# Patient Record
Sex: FEMALE | Race: WHITE | NOT HISPANIC OR LATINO | Employment: FULL TIME | URBAN - METROPOLITAN AREA
[De-identification: names, ages, dates, MRNs, and addresses within clinical notes are randomized per-mention and may not be internally consistent; named-entity substitution may affect disease eponyms.]

---

## 2017-01-03 ENCOUNTER — GENERIC CONVERSION - ENCOUNTER (OUTPATIENT)
Dept: OTHER | Facility: OTHER | Age: 32
End: 2017-01-03

## 2018-01-04 ENCOUNTER — APPOINTMENT (OUTPATIENT)
Dept: URGENT CARE | Facility: CLINIC | Age: 33
End: 2018-01-04
Payer: OTHER MISCELLANEOUS

## 2018-01-04 PROCEDURE — 99283 EMERGENCY DEPT VISIT LOW MDM: CPT

## 2018-01-04 PROCEDURE — G0382 LEV 3 HOSP TYPE B ED VISIT: HCPCS

## 2018-01-11 ENCOUNTER — TRANSCRIBE ORDERS (OUTPATIENT)
Dept: URGENT CARE | Facility: CLINIC | Age: 33
End: 2018-01-11

## 2018-01-11 ENCOUNTER — APPOINTMENT (OUTPATIENT)
Dept: URGENT CARE | Facility: CLINIC | Age: 33
End: 2018-01-11
Payer: OTHER MISCELLANEOUS

## 2018-01-11 ENCOUNTER — APPOINTMENT (OUTPATIENT)
Dept: RADIOLOGY | Facility: CLINIC | Age: 33
End: 2018-01-11
Payer: OTHER MISCELLANEOUS

## 2018-01-11 ENCOUNTER — GENERIC CONVERSION - ENCOUNTER (OUTPATIENT)
Dept: OTHER | Facility: OTHER | Age: 33
End: 2018-01-11

## 2018-01-11 DIAGNOSIS — T14.90XA INJURY: Primary | ICD-10-CM

## 2018-01-11 PROCEDURE — 99213 OFFICE O/P EST LOW 20 MIN: CPT

## 2018-01-11 PROCEDURE — 72050 X-RAY EXAM NECK SPINE 4/5VWS: CPT

## 2018-01-15 NOTE — RESULT NOTES
Message   Normal thyroid testing       Verified Results  (LC) Thyroxine (T4) Free, Direct, S [id number] 09:40AM Janiya Hendrickson, [place]     Test Name Result Flag Reference   T4,Free(Direct) 1 09 ng/dL  0 82-1 77     (1) TSH [id number] 09:40AM Jacinda, [place]     Test Name Result Flag Reference   TSH 3 620 uIU/mL  0 450-4 500

## 2018-01-17 ENCOUNTER — TRANSCRIBE ORDERS (OUTPATIENT)
Dept: URGENT CARE | Facility: CLINIC | Age: 33
End: 2018-01-17

## 2018-01-17 ENCOUNTER — APPOINTMENT (OUTPATIENT)
Dept: URGENT CARE | Facility: CLINIC | Age: 33
End: 2018-01-17
Payer: OTHER MISCELLANEOUS

## 2018-01-17 ENCOUNTER — GENERIC CONVERSION - ENCOUNTER (OUTPATIENT)
Dept: OTHER | Facility: OTHER | Age: 33
End: 2018-01-17

## 2018-01-17 ENCOUNTER — APPOINTMENT (OUTPATIENT)
Dept: RADIOLOGY | Facility: CLINIC | Age: 33
End: 2018-01-17
Payer: OTHER MISCELLANEOUS

## 2018-01-17 DIAGNOSIS — T14.90XA INJURY: Primary | ICD-10-CM

## 2018-01-17 PROCEDURE — 99213 OFFICE O/P EST LOW 20 MIN: CPT

## 2018-01-17 PROCEDURE — 73030 X-RAY EXAM OF SHOULDER: CPT

## 2018-01-23 ENCOUNTER — APPOINTMENT (OUTPATIENT)
Dept: URGENT CARE | Facility: CLINIC | Age: 33
End: 2018-01-23
Payer: OTHER MISCELLANEOUS

## 2018-01-23 PROCEDURE — 99213 OFFICE O/P EST LOW 20 MIN: CPT

## 2018-01-24 ENCOUNTER — EVALUATION (OUTPATIENT)
Dept: PHYSICAL THERAPY | Facility: CLINIC | Age: 33
End: 2018-01-24
Payer: OTHER MISCELLANEOUS

## 2018-01-24 DIAGNOSIS — M25.552 PAIN OF LEFT HIP JOINT: ICD-10-CM

## 2018-01-24 DIAGNOSIS — M54.2 CERVICALGIA: ICD-10-CM

## 2018-01-24 DIAGNOSIS — M25.511 ACUTE PAIN OF BOTH SHOULDERS: ICD-10-CM

## 2018-01-24 DIAGNOSIS — M54.50 ACUTE BILATERAL LOW BACK PAIN WITHOUT SCIATICA: Primary | ICD-10-CM

## 2018-01-24 DIAGNOSIS — M25.512 ACUTE PAIN OF BOTH SHOULDERS: ICD-10-CM

## 2018-01-24 PROCEDURE — G8990 OTHER PT/OT CURRENT STATUS: HCPCS | Performed by: PHYSICAL THERAPIST

## 2018-01-24 PROCEDURE — 97162 PT EVAL MOD COMPLEX 30 MIN: CPT | Performed by: PHYSICAL THERAPIST

## 2018-01-24 PROCEDURE — G8991 OTHER PT/OT GOAL STATUS: HCPCS | Performed by: PHYSICAL THERAPIST
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
## 2018-01-26 ENCOUNTER — OFFICE VISIT (OUTPATIENT)
Dept: PHYSICAL THERAPY | Facility: CLINIC | Age: 33
End: 2018-01-26
Payer: OTHER MISCELLANEOUS

## 2018-01-26 DIAGNOSIS — M54.2 CERVICALGIA: ICD-10-CM

## 2018-01-26 DIAGNOSIS — M54.50 ACUTE BILATERAL LOW BACK PAIN WITHOUT SCIATICA: Primary | ICD-10-CM

## 2018-01-26 DIAGNOSIS — M25.511 ACUTE PAIN OF BOTH SHOULDERS: ICD-10-CM

## 2018-01-26 DIAGNOSIS — M25.512 ACUTE PAIN OF BOTH SHOULDERS: ICD-10-CM

## 2018-01-26 DIAGNOSIS — M25.552 PAIN OF LEFT HIP JOINT: ICD-10-CM

## 2018-01-26 PROCEDURE — 97140 MANUAL THERAPY 1/> REGIONS: CPT | Performed by: PHYSICAL THERAPIST

## 2018-01-26 PROCEDURE — 97010 HOT OR COLD PACKS THERAPY: CPT | Performed by: PHYSICAL THERAPIST

## 2018-01-26 PROCEDURE — 97112 NEUROMUSCULAR REEDUCATION: CPT | Performed by: PHYSICAL THERAPIST

## 2018-01-26 PROCEDURE — 97110 THERAPEUTIC EXERCISES: CPT | Performed by: PHYSICAL THERAPIST

## 2018-01-26 NOTE — PROGRESS NOTES
Daily Note     Today's date: 2018  Patient name: Carisa Harrell  : 1985  MRN: [id number]  Referring provider: Rosalie Kumari DO  Dx:   Encounter Diagnoses   Name Primary?  Acute bilateral low back pain without sciatica Yes    Cervicalgia     Acute pain of both shoulders     Pain of left hip joint                   Subjective: Pt reports that she was sore after Ie  Objective: See treatment diary below      Assessment: Initiated pt's plan of care as above this visit w/ good tolerance  Tolerated treatment well  Patient could benefit from continued PT      Plan: Continue per plan of care         Precautions: None    Daily Treatment Diary     Manual             PROM to c/s, b/l shoulders, L hip NV 15'           GHJ mobs b/l  NV 4'           STM/TPR to l/s/L lateral hip NV 17'                                         Exercise Diary              Post  Shoulder rolls NV 20x           Scap Ret NV 10"x10           C/s ret NV 10"x10           C/s rotations NV NV           UT stretch- b/l NV 30"x 2 ea           T/s rotations NV 10x ea           Pendulums NV NV           Supine AAROM shoulder flex NV 10"x10x            Hip fall outs NV 10" x 10 ea           DLS: PPT NV 06"C87A           DLS: iso hip add NV            DLS: iso hip abd NV            DLS: bridges NV            DLS: LTR  39"S93                                                                                             Modalities             CP to L hip/back and b/l shoulders home 10'

## 2018-01-26 NOTE — PROGRESS NOTES
{SL AMB PT/OT NOTE SQOP:13018}    Today's date: 2018  Patient name: Saturnino Rudd  : 1985  MRN: [id number]  Referring provider: Stan Miramontes DO  Dx: No diagnosis found                 Assessment/Plan    Subjective    Objective    Precautions: ***    Daily Treatment Diary     Manual                                                                                   Exercise Diary                                                                                                                                                                                                                                                                                      Modalities

## 2018-01-29 ENCOUNTER — OFFICE VISIT (OUTPATIENT)
Dept: PHYSICAL THERAPY | Facility: CLINIC | Age: 33
End: 2018-01-29
Payer: OTHER MISCELLANEOUS

## 2018-01-29 DIAGNOSIS — M54.50 ACUTE BILATERAL LOW BACK PAIN WITHOUT SCIATICA: Primary | ICD-10-CM

## 2018-01-29 DIAGNOSIS — M54.2 CERVICALGIA: ICD-10-CM

## 2018-01-29 DIAGNOSIS — M25.511 ACUTE PAIN OF BOTH SHOULDERS: ICD-10-CM

## 2018-01-29 DIAGNOSIS — M25.552 PAIN OF LEFT HIP JOINT: ICD-10-CM

## 2018-01-29 DIAGNOSIS — M25.512 ACUTE PAIN OF BOTH SHOULDERS: ICD-10-CM

## 2018-01-29 PROCEDURE — 97010 HOT OR COLD PACKS THERAPY: CPT

## 2018-01-29 PROCEDURE — 97140 MANUAL THERAPY 1/> REGIONS: CPT

## 2018-01-29 PROCEDURE — 97110 THERAPEUTIC EXERCISES: CPT

## 2018-01-29 NOTE — PROGRESS NOTES
Daily Note     Today's date: 2018  Patient name: Ekta Lewis  : 1985  MRN: [id number]  Referring provider: Lashonda Garvey DO  Dx:   No diagnosis found  Subjective:  Pt reports she was very sore post LV (especially her hip) however, felt pretty good the next day  Notes that her R shoulder and LB are the most sore areas today  Objective: See treatment diary below      Assessment: Pt reports feeling very sore post LV -- held progression of TE for today and instead focused on form and manuals  Plan: To monitor and progress as able nv       Precautions: None    Daily Treatment Diary     Manual            PROM to c/s, b/l shoulders, L hip NV 15' 12'          GHJ mobs b/l  NV 4' resume nv          STM/TPR to l/s/L lateral hip NV 17' 8'                                        Exercise Diary             Post  Shoulder rolls NV 20x HEP           Scap Ret NV 10"x10 10"X10           C/s ret NV 10"x10 5"X10           C/s rotations NV NV 10x ea          UT stretch- b/l NV 30"x 2 ea HEP           T/s rotations NV 10x ea 10x ea          Pendulums (m/l & a/p)  NV NV 20x           Supine AAROM shoulder flex NV 10"x10x  resume nv          Hip fall outs NV 10" x 10 ea resume nv          DLS: PPT NV 40"Y97N W/ TE          DLS: iso hip add NV  10"x10          DLS: iso hip abd NV  10"X10          DLS: bridges NV  NV          DLS: LTR  76"X79 np                                                                                            Modalities            CP to L hip/back and b/l shoulders home 10' 10'

## 2018-01-31 ENCOUNTER — OFFICE VISIT (OUTPATIENT)
Dept: PHYSICAL THERAPY | Facility: CLINIC | Age: 33
End: 2018-01-31
Payer: OTHER MISCELLANEOUS

## 2018-01-31 DIAGNOSIS — M54.2 CERVICALGIA: ICD-10-CM

## 2018-01-31 DIAGNOSIS — M25.512 ACUTE PAIN OF BOTH SHOULDERS: ICD-10-CM

## 2018-01-31 DIAGNOSIS — M54.50 ACUTE BILATERAL LOW BACK PAIN WITHOUT SCIATICA: Primary | ICD-10-CM

## 2018-01-31 DIAGNOSIS — M25.511 ACUTE PAIN OF BOTH SHOULDERS: ICD-10-CM

## 2018-01-31 DIAGNOSIS — M25.552 PAIN OF LEFT HIP JOINT: ICD-10-CM

## 2018-01-31 PROCEDURE — 97010 HOT OR COLD PACKS THERAPY: CPT

## 2018-01-31 PROCEDURE — 97140 MANUAL THERAPY 1/> REGIONS: CPT

## 2018-01-31 PROCEDURE — 97110 THERAPEUTIC EXERCISES: CPT

## 2018-01-31 NOTE — PROGRESS NOTES
Daily Note     Today's date: 2018  Patient name: Fatmata Jean  : 1985  MRN: [id number]  Referring provider: Constance Chiu DO  Dx:   Encounter Diagnoses   Name Primary?  Acute bilateral low back pain without sciatica Yes    Cervicalgia     Acute pain of both shoulders     Pain of left hip joint                   Subjective:  Pt reports she felt good post LV however, yesterday noted that her neck into her t/s are very stiff and sore  Post session reports feeling less stiff and better able to turn head to R  Objective: See treatment diary below      Assessment: Performed above listed TE however, focused primarily t/s and c/s today to aid in pain relief  Pt w/ most relief from MFR to t/s and stretching today  Concluded with MHP to aid in further relaxing fascia and muscles today  Pt notes her t/s has been "cracking" a lot more recently  May consider t/s mobs nv if applicable - to discuss with primary PT   To monitor and resume hip / LB TE nv if able  Plan: To monitor and progress as able nv       Precautions: None    Daily Treatment Diary     Manual           PROM to c/s, b/l shoulders, L hip NV 15' 12' Focus c/s 8'          GHJ mobs b/l  NV 4' resume nv np          STM/TPR to l/s/L lateral hip NV 16' 8' np         MFR with ball to b/l t/s and lower c/s     10'                          Exercise Diary            pulleys    3' flex         Post  Shoulder rolls NV 20x HEP  HEP         Scap Ret NV 10"x10 10"X10  HEP         C/s ret NV 10"x10 5"X10  10"X10 supine         C/s rotations NV NV 10x ea 10x ea         UT stretch- b/l NV 30"x 2 ea HEP  HEP         T/s rotations NV 10x ea 10x ea 10x ea         Pendulums (m/l & a/p)  NV NV 20x  np         Supine AAROM shoulder flex NV 10"x10x  resume nv Resume nv         Hip fall outs NV 10" x 10 ea resume nv 10xea         DLS: PPT NV 16"W43R W/ TE W/ TE         DLS: iso hip add NV  10"x10 Resume nv         DLS: iso hip abd NV  10"X10 Resume nv         DLS: bridges NV  NV NV         DLS: LTR  93"M15 np np         Cat/camel stretch     10x ea                                                                              Modalities  1/24 1/26 1/29 1/31         CP to L hip/back and b/l shoulders home 10' 10'  hold         MHP (c/s/ upper t/s)    10'

## 2018-02-02 ENCOUNTER — OFFICE VISIT (OUTPATIENT)
Dept: PHYSICAL THERAPY | Facility: CLINIC | Age: 33
End: 2018-02-02
Payer: OTHER MISCELLANEOUS

## 2018-02-02 DIAGNOSIS — M25.552 PAIN OF LEFT HIP JOINT: ICD-10-CM

## 2018-02-02 DIAGNOSIS — M25.512 ACUTE PAIN OF BOTH SHOULDERS: ICD-10-CM

## 2018-02-02 DIAGNOSIS — M25.511 ACUTE PAIN OF BOTH SHOULDERS: ICD-10-CM

## 2018-02-02 DIAGNOSIS — M54.2 CERVICALGIA: ICD-10-CM

## 2018-02-02 DIAGNOSIS — M54.50 ACUTE BILATERAL LOW BACK PAIN WITHOUT SCIATICA: Primary | ICD-10-CM

## 2018-02-02 PROCEDURE — 97140 MANUAL THERAPY 1/> REGIONS: CPT | Performed by: PHYSICAL THERAPIST

## 2018-02-02 PROCEDURE — 97110 THERAPEUTIC EXERCISES: CPT | Performed by: PHYSICAL THERAPIST

## 2018-02-02 PROCEDURE — 97010 HOT OR COLD PACKS THERAPY: CPT | Performed by: PHYSICAL THERAPIST

## 2018-02-02 PROCEDURE — 97112 NEUROMUSCULAR REEDUCATION: CPT | Performed by: PHYSICAL THERAPIST

## 2018-02-02 NOTE — PROGRESS NOTES
Daily Note     Today's date: 2018  Patient name: Merari Martinez  : 1985  MRN: [id number]  Referring provider: Darren Sarkar DO  Dx:   Encounter Diagnoses   Name Primary?  Acute bilateral low back pain without sciatica Yes    Cervicalgia     Acute pain of both shoulders     Pain of left hip joint                   Subjective:  Pt reports significant improvement in symptoms since her Lv  She reports that lumbar pain is improving and hip pain not much of an issue anymore  She reports that L shoulder and c/s are her biggest areas of pain at this time, but are much looser since her Lv  Objective: See treatment diary below      Assessment: Tx session tolerated well this visit  Plan to continue to add in scap stab exercises NV and possibly self foam roller stretching  Also advised in self TPR using tennis ball b/w tx sessions  Concluded w/ CP as pt felt it provided better relief  Plan: To monitor and progress as able nv  Precautions: None    Daily Treatment Diary     Manual   2/2        PROM to c/s, b/l shoulders; Not stretching L hip due to lack of pain NV 15' 12' Focus c/s 8'  12'        GHJ mobs b/l  NV 4' resume nv np  4'        STM/TPR to L lateral hip NV 16' 8' np NP due to lack of pain        MFR with ball to b/l t/s and lumbosacral area    10' 10'                         Exercise Diary    2/2       Bike     5' -CAN HOLD NV       pulleys    3' flex 3' flex       C/s ret NV 10"x10 5"X10  10"X10 supine 10" x10 supine- consider foam roller nv       C/s rotations NV NV 10x ea 10x ea np-consider foam roller nv       UT stretch- b/l NV 30"x 2 ea HEP  HEP HEP       T/s rotations (s/l) NV 10x ea 10x ea 10x ea 1-2" x 10 ea       Supine AAROM shoulder flex NV 10"x10x  resume nv Resume nv 10"x10       DLS: PPT NV 32"H99Y W/ TE W/ TE W/ tE       DLS: iso hip add NV  10"x10 Resume nv Resume nv!        DLS: bridges w/ hip ER (tb resist) NV  NV NV NV DLS: LTR  53"Q28 np np 10"x10 ea       Cat/camel stretch     10x ea 10" x 10 ea (wrist on edge of table)       Modified rows-over table     NV       LPD-pball     NV                                               Modalities  1/24 1/26 1/29 1/31 2/2        CP to l/s and t/s in prone (pt prefers) home 10' 10'  hold 10'        MHP (c/s/ upper t/s)    10' HELD

## 2018-02-05 ENCOUNTER — OFFICE VISIT (OUTPATIENT)
Dept: PHYSICAL THERAPY | Facility: CLINIC | Age: 33
End: 2018-02-05
Payer: OTHER MISCELLANEOUS

## 2018-02-05 DIAGNOSIS — M54.2 CERVICALGIA: ICD-10-CM

## 2018-02-05 DIAGNOSIS — M54.50 ACUTE BILATERAL LOW BACK PAIN WITHOUT SCIATICA: Primary | ICD-10-CM

## 2018-02-05 DIAGNOSIS — M25.511 ACUTE PAIN OF BOTH SHOULDERS: ICD-10-CM

## 2018-02-05 DIAGNOSIS — M25.552 PAIN OF LEFT HIP JOINT: ICD-10-CM

## 2018-02-05 DIAGNOSIS — M25.512 ACUTE PAIN OF BOTH SHOULDERS: ICD-10-CM

## 2018-02-05 PROCEDURE — 97140 MANUAL THERAPY 1/> REGIONS: CPT

## 2018-02-05 PROCEDURE — 97010 HOT OR COLD PACKS THERAPY: CPT

## 2018-02-05 PROCEDURE — 97110 THERAPEUTIC EXERCISES: CPT

## 2018-02-05 NOTE — PROGRESS NOTES
Daily Note     Today's date: 2018  Patient name: Belkys Garsia  : 1985  MRN: [id number]  Referring provider: Johnathon Galvin DO  Dx:   Encounter Diagnoses   Name Primary?  Acute bilateral low back pain without sciatica Yes    Cervicalgia     Acute pain of both shoulders     Pain of left hip joint                   Subjective:  Pt reports he hip is really no longer a problem and she only occasionally gets LBP  Notes her c/s is still very sore  w/ stiffness into her t/s  Reports her R shoulder is still sore a "pops"  Objective: See treatment diary below    Assessment: Progressed TE as listed below with fair tolerance  Trialed 1/2 foam roller however, pt with too much t/s discomfort post-- held post no change in s/s of pain  Noted pt w/ great t/s rotation on L than to R today  Incorporated t/s mobs noting improved pain free R c/s rotation post  Concluded with Cp  Plan: To monitor and progress as able nv  EPOC:   Precautions: None    Daily Treatment Diary     Manual        PROM to c/s, b/l shoulders;    Not stretching L hip due to lack of pain NV 15' 12' Focus c/s 8'  12' 8'       GHJ mobs b/l  NV 4' resume nv np  4' np      STM/TPR to L lateral hip NV 16' 8' np NP due to lack of pain NP      MFR with ball to b/l t/s and lumbosacral area    10' 10' 10'       T/s p-->a mobs (gr III) performed by [facility] D[facility]      2'           Exercise Diary         Bike     5' -CAN HOLD NV NP      pulleys    3' flex 3' flex 3' flex      C/s ret NV 10"x10 5"X10  10"X10 supine 10" x10 supine- consider foam roller nv (1/2 foam roller) 10"x10      C/s rotations NV NV 10x ea 10x ea np-consider foam roller nv (1/2 foam roller) 10x ea      UT stretch- b/l NV 30"x 2 ea HEP  HEP HEP HEP      T/s rotations (s/l) NV 10x ea 10x ea 10x ea 1-2" x 10 ea 3"x 10 ea (b/l)      Supine AAROM shoulder flex NV 10"x10x  resume nv Resume nv 10"x10 3"x10      DLS: PPT NV 83"M38B W/ TE W/ TE W/ tE W/ TE      DLS: iso hip add NV  10"x10 Resume nv Resume nv! 10"X10      DLS: bridges w/ hip ER (tb resist) NV  NV NV NV 10"X10      DLS: LTR  21"W20 np np 10"x10 ea 10"x10 ea      Cat/camel stretch     10x ea 10" x 10 ea (wrist on edge of table) Held at pt's request      Modified rows-over table     NV 15x w/ Ritesh for scap      LPD-pball     NV 15x OTB                                              Modalities  1/24 1/26 1/29 1/31 2/2 2/5       CP to l/s and t/s in prone (pt prefers) home 10' 10'  hold 10' 10' seated and t/s only 2/5       MHP (c/s/ upper t/s)    10' HELD

## 2018-02-06 ENCOUNTER — APPOINTMENT (OUTPATIENT)
Dept: URGENT CARE | Facility: CLINIC | Age: 33
End: 2018-02-06
Payer: OTHER MISCELLANEOUS

## 2018-02-06 PROCEDURE — 99213 OFFICE O/P EST LOW 20 MIN: CPT

## 2018-02-07 ENCOUNTER — OFFICE VISIT (OUTPATIENT)
Dept: PHYSICAL THERAPY | Facility: CLINIC | Age: 33
End: 2018-02-07
Payer: OTHER MISCELLANEOUS

## 2018-02-07 DIAGNOSIS — M54.2 CERVICALGIA: ICD-10-CM

## 2018-02-07 DIAGNOSIS — M54.50 ACUTE BILATERAL LOW BACK PAIN WITHOUT SCIATICA: Primary | ICD-10-CM

## 2018-02-07 DIAGNOSIS — M25.512 ACUTE PAIN OF BOTH SHOULDERS: ICD-10-CM

## 2018-02-07 DIAGNOSIS — M25.552 PAIN OF LEFT HIP JOINT: ICD-10-CM

## 2018-02-07 DIAGNOSIS — M25.511 ACUTE PAIN OF BOTH SHOULDERS: ICD-10-CM

## 2018-02-07 PROCEDURE — 97010 HOT OR COLD PACKS THERAPY: CPT

## 2018-02-07 PROCEDURE — 97110 THERAPEUTIC EXERCISES: CPT

## 2018-02-07 PROCEDURE — 97140 MANUAL THERAPY 1/> REGIONS: CPT

## 2018-02-07 NOTE — PROGRESS NOTES
Daily Note     Today's date: 2018  Patient name: Mary Arroyo  : 1985  MRN: [id number]  Referring provider: Saul Santana DO  Dx:   Encounter Diagnoses   Name Primary?  Acute bilateral low back pain without sciatica Yes    Cervicalgia     Acute pain of both shoulders     Pain of left hip joint                   Subjective:  Pt reports she just feels like there is a "ludin" from her neck down into the top of her lower back  Objective: See treatment diary below    Assessment: Progressed TE as listed below with good tolerance  Continued with t/s mobs as pt reports that really helped  Incorporated gentle IASTM to b/l t/s psp with good response and improved motion  Continued with manuals noting drastically improved PROM b/l post  GHJ mobs performed on L side however, held on R today  Concluded with Cp  Plan: To monitor and progress as able nv  EPOC:   Precautions: None    Daily Treatment Diary     Manual       PROM to c/s, b/l shoulders;    Not stretching L hip due to lack of pain NV 15' 12' Focus c/s 8'  12' 8'  7'     GHJ mobs b/l  NV 4' resume nv np  4' np 2' JR DPT     STM/TPR to L lateral hip NV 17' 8' np NP due to lack of pain NP NP     IASTM to b/l t/s  area    10' 10' 6'  4'     T/s p-->a mobs (gr III) performed by [facility] DPT      2'  2'         Exercise Diary        Bike     5' -CAN HOLD NV NP NP     pulleys    3' flex 3' flex 3' flex 3'     C/s ret NV 10"x10 5"X10  10"X10 supine 10" x10 supine- consider foam roller nv (1/2 foam roller) 10"x10 10"x10      C/s rotations NV NV 10x ea 10x ea np-consider foam roller nv (1/2 foam roller) 10x ea 10x      UT stretch- b/l NV 30"x 2 ea HEP  HEP HEP HEP HEP     T/s rotations (s/l) NV 10x ea 10x ea 10x ea 1-2" x 10 ea 3"x 10 ea (b/l) 5"x10 seated     Supine AAROM shoulder flex NV 10"x10x  resume nv Resume nv 10"x10 3"x10 3"x10      DLS: PPT NV 84"R38E W/ TE W/ TE W/ tE W/ TE w/TE     DLS: iso hip add NV  10"x10 Resume nv Resume nv! 10"X10 W/ UE TE     DLS: bridges w/ hip ER (tb resist) NV  NV NV NV 10"X10 Resume nv     DLS: LTR  76"O18 np np 10"x10 ea 10"x10 ea 10"X10      Cat/camel stretch     10x ea 10" x 10 ea (wrist on edge of table) Held at pt's request 10"X10     Modified rows-over table     NV 15x w/ Ritesh for scap Resume nv     LPD-pball     NV 15x OTB np     pec stretch (supine)       30"X3 ea                                 Modalities  1/24 1/26 1/29 1/31 2/2 2/5 2/7      CP to l/s and t/s in prone (pt prefers) home 10' 10'  hold 10' 10' seated and t/s only 2/5 10'  Avoided t/s 2 to IASTM      MHP (c/s/ upper t/s)    10' HELD

## 2018-02-09 ENCOUNTER — OFFICE VISIT (OUTPATIENT)
Dept: PHYSICAL THERAPY | Facility: CLINIC | Age: 33
End: 2018-02-09
Payer: OTHER MISCELLANEOUS

## 2018-02-09 DIAGNOSIS — M25.512 ACUTE PAIN OF BOTH SHOULDERS: ICD-10-CM

## 2018-02-09 DIAGNOSIS — M25.511 ACUTE PAIN OF BOTH SHOULDERS: ICD-10-CM

## 2018-02-09 DIAGNOSIS — M54.50 ACUTE BILATERAL LOW BACK PAIN WITHOUT SCIATICA: Primary | ICD-10-CM

## 2018-02-09 DIAGNOSIS — M54.2 CERVICALGIA: ICD-10-CM

## 2018-02-09 DIAGNOSIS — M25.552 PAIN OF LEFT HIP JOINT: ICD-10-CM

## 2018-02-09 PROCEDURE — 97112 NEUROMUSCULAR REEDUCATION: CPT | Performed by: PHYSICAL THERAPIST

## 2018-02-09 PROCEDURE — 97110 THERAPEUTIC EXERCISES: CPT | Performed by: PHYSICAL THERAPIST

## 2018-02-09 PROCEDURE — 97140 MANUAL THERAPY 1/> REGIONS: CPT | Performed by: PHYSICAL THERAPIST

## 2018-02-09 NOTE — PROGRESS NOTES
Daily Note     Today's date: 2018  Patient name: Sid Isabel  : 1985  MRN: [id number]  Referring provider: Angie Nielson DO  Dx:   Encounter Diagnoses   Name Primary?  Acute bilateral low back pain without sciatica Yes    Cervicalgia     Acute pain of both shoulders     Pain of left hip joint                   Subjective:  Pt reports feeling very good since Lv  Only complaint is some pain in l/s    Objective: See treatment diary below    Assessment: TE tolerated well  Progress as tolerated NV>     Plan: To monitor and progress as able nv  5' change time  EPOC:   Precautions: None    Daily Treatment Diary     Manual      PROM to c/s, b/l shoulders;    Not stretching L hip due to lack of pain NV 15' 12' Focus c/s 8'  12' 8'  7' 10'    GHJ mobs b/l  NV 4' resume nv np  4' np 2' JR DPT 2'    STM/TPR to L lateral hip NV 17' 8' np NP due to lack of pain NP NP NP    IASTM to b/l t/s and l/s area    10' 10' 6'  4' 8'    T/s p-->a mobs (gr III) performed by [facility] DPT      2'  2' 2'        Exercise Diary       Bike     5' -CAN HOLD NV NP NP 5'    pulleys    3' flex 3' flex 3' flex 3' NP    C/s ret NV 10"x10 5"X10  10"X10 supine 10" x10 supine- consider foam roller nv (1/2 foam roller) 10"x10 10"x10  NP    C/s rotations NV NV 10x ea 10x ea np-consider foam roller nv (1/2 foam roller) 10x ea 10x  NP    UT stretch- b/l NV 30"x 2 ea HEP  HEP HEP HEP HEP NP    T/s rotations 425(s/l) NV 10x ea 10x ea 10x ea 1-2" x 10 ea 3"x 10 ea (b/l) 5"x10 seated NP    Supine AAROM shoulder flex NV 10"x10x  resume nv Resume nv 10"x10 3"x10 3"x10  NP    DLS: PPT NV 05"O89T W/ TE W/ TE W/ tE W/ TE w/TE 10"x10    DLS: iso hip add NV  10"x10 Resume nv Resume nv! 10"X10 W/ UE TE NP    DLS: bridges w/ hip ER (tb resist) NV  NV NV NV 10"X10 Resume nv 10" x 10  OTB    DLS: LTR  90"F01 np np 10"x10 ea 10"x10 ea 10"X10  10"x10    Cat/camel stretch     10x ea 10" x 10 ea (wrist on edge of table) Held at pt's request 10"X10 Hold as per pt request    Modified rows-over table     NV 15x w/ Ritesh for scap Resume nv NP    LPD-pball     NV 15x OTB np NP    pec stretch (supine)       30"X3 ea NP    DKTC w/ pball        30"x 3                    Modalities  1/24 1/26 1/29 1/31 2/2 2/5 2/7 2/9     CP to l/s and t/s in prone (pt prefers) home 10' 10'  hold 10' 10' seated and t/s only 2/5 10'  Avoided t/s 2 to IASTM  NP     MHP (c/s/ upper t/s)    10' HELD   NP

## 2018-02-12 ENCOUNTER — OFFICE VISIT (OUTPATIENT)
Dept: PHYSICAL THERAPY | Facility: CLINIC | Age: 33
End: 2018-02-12
Payer: OTHER MISCELLANEOUS

## 2018-02-12 DIAGNOSIS — M25.552 PAIN OF LEFT HIP JOINT: ICD-10-CM

## 2018-02-12 DIAGNOSIS — M25.511 ACUTE PAIN OF BOTH SHOULDERS: ICD-10-CM

## 2018-02-12 DIAGNOSIS — M54.2 CERVICALGIA: ICD-10-CM

## 2018-02-12 DIAGNOSIS — M25.512 ACUTE PAIN OF BOTH SHOULDERS: ICD-10-CM

## 2018-02-12 DIAGNOSIS — M54.50 ACUTE BILATERAL LOW BACK PAIN WITHOUT SCIATICA: Primary | ICD-10-CM

## 2018-02-12 PROCEDURE — 97112 NEUROMUSCULAR REEDUCATION: CPT | Performed by: PHYSICAL THERAPIST

## 2018-02-12 PROCEDURE — 97140 MANUAL THERAPY 1/> REGIONS: CPT | Performed by: PHYSICAL THERAPIST

## 2018-02-12 PROCEDURE — 97110 THERAPEUTIC EXERCISES: CPT | Performed by: PHYSICAL THERAPIST

## 2018-02-12 NOTE — PROGRESS NOTES
Daily Note     Today's date: 2018  Patient name: Leopold Penman  : 1985  MRN: [id number]  Referring provider: Yue Walton DO  Dx:   Encounter Diagnoses   Name Primary?  Acute bilateral low back pain without sciatica Yes    Cervicalgia     Acute pain of both shoulders     Pain of left hip joint                   Subjective:  Pt continues to report progress  She reports l/s feels much better since IASTM to that area  Objective: See treatment diary below    Assessment: TE tolerated well  Plan to RA NV prior to f/u w/ doctor  Plan: To monitor and progress as able nv  5' change time  EPOC:   Precautions: None    Daily Treatment Diary     Manual     PROM to c/s, b/l shoulders;    Not stretching L hip due to lack of pain NV 15' 12' Focus c/s 8'  12' 8'  7' 10' 10'   GHJ mobs b/l  NV 4' resume nv np  4' np 2' JR DPT 2' 2'   STM/TPR to L lateral hip NV 17' 8' np NP due to lack of pain NP NP NP NP   IASTM to b/l t/s and l/s area    10' 10' 6'  4' 8' 8'   T/s p-->a mobs (gr III) performed by RS DPT      2'  2' 2' 2'       Exercise Diary      Bike     5' -CAN HOLD NV NP NP 5' 5'   pulleys    3' flex 3' flex 3' flex 3' NP NP   C/s ret NV 10"x10 5"X10  10"X10 supine 10" x10 supine- consider foam roller nv (1/2 foam roller) 10"x10 10"x10  NP NP   C/s rotations NV NV 10x ea 10x ea np-consider foam roller nv (1/2 foam roller) 10x ea 10x  NP NP   UT stretch- b/l NV 30"x 2 ea HEP  HEP HEP HEP HEP NP NP   T/s rotations (s/l) NV 10x ea 10x ea 10x ea 1-2" x 10 ea 3"x 10 ea (b/l) 5"x10 seated NP 1-2" x 10 ea   Supine AAROM shoulder flex NV 10"x10x  resume nv Resume nv 10"x10 3"x10 3"x10  NP 10"x10   DLS: PPT NV 96"I24A W/ TE W/ TE W/ tE W/ TE w/TE 10"x10 W/ TE   DLS: iso hip add NV  10"x10 Resume nv Resume nv! 10"X10 W/ UE TE NP NP   DLS: bridges w/ hip ER (tb resist) NV  NV NV NV 10"X10 Resume nv 10" x 10  OTB NP   DLS: LTR 10"x10 np np 10"x10 ea 10"x10 ea 10"X10  10"x10 10"x10   Cat/camel stretch     10x ea 10" x 10 ea (wrist on edge of table) Held at pt's request 10"X10 Hold as per pt request Hold as per pt request   Modified rows-over table     NV 15x w/ Ritesh for scap Resume nv NP 2# 15x ea   LPD-pball     NV 15x OTB np NP 2x15 standing   pec stretch (supine)       30"X3 ea NP NP   DKTC w/ pball        30"x 3 30"x3                   Modalities  1/24 1/26 1/29 1/31 2/2 2/5 2/7 2/9 2/12    CP to l/s and t/s in prone (pt prefers) home 10' 10'  hold 10' 10' seated and t/s only 2/5 10'  Avoided t/s 2 to IASTM  NP NP    MHP (c/s/ upper t/s)    10' HELD   NP NP

## 2018-02-14 ENCOUNTER — EVALUATION (OUTPATIENT)
Dept: PHYSICAL THERAPY | Facility: CLINIC | Age: 33
End: 2018-02-14
Payer: OTHER MISCELLANEOUS

## 2018-02-14 ENCOUNTER — OFFICE VISIT (OUTPATIENT)
Dept: URGENT CARE | Facility: CLINIC | Age: 33
End: 2018-02-14
Payer: COMMERCIAL

## 2018-02-14 VITALS
TEMPERATURE: 99.5 F | HEART RATE: 98 BPM | OXYGEN SATURATION: 97 % | SYSTOLIC BLOOD PRESSURE: 118 MMHG | DIASTOLIC BLOOD PRESSURE: 68 MMHG | RESPIRATION RATE: 18 BRPM

## 2018-02-14 DIAGNOSIS — M25.552 PAIN OF LEFT HIP JOINT: ICD-10-CM

## 2018-02-14 DIAGNOSIS — M25.511 ACUTE PAIN OF BOTH SHOULDERS: ICD-10-CM

## 2018-02-14 DIAGNOSIS — R68.89 FLU-LIKE SYMPTOMS: Primary | ICD-10-CM

## 2018-02-14 DIAGNOSIS — M25.512 ACUTE PAIN OF BOTH SHOULDERS: ICD-10-CM

## 2018-02-14 DIAGNOSIS — J06.9 VIRAL URI WITH COUGH: ICD-10-CM

## 2018-02-14 DIAGNOSIS — M54.2 CERVICALGIA: ICD-10-CM

## 2018-02-14 DIAGNOSIS — M54.50 ACUTE BILATERAL LOW BACK PAIN WITHOUT SCIATICA: Primary | ICD-10-CM

## 2018-02-14 PROCEDURE — G8991 OTHER PT/OT GOAL STATUS: HCPCS | Performed by: PHYSICAL THERAPIST

## 2018-02-14 PROCEDURE — G0382 LEV 3 HOSP TYPE B ED VISIT: HCPCS | Performed by: FAMILY MEDICINE

## 2018-02-14 PROCEDURE — 97110 THERAPEUTIC EXERCISES: CPT | Performed by: PHYSICAL THERAPIST

## 2018-02-14 PROCEDURE — G8990 OTHER PT/OT CURRENT STATUS: HCPCS | Performed by: PHYSICAL THERAPIST

## 2018-02-14 PROCEDURE — 99283 EMERGENCY DEPT VISIT LOW MDM: CPT | Performed by: FAMILY MEDICINE

## 2018-02-14 PROCEDURE — 97140 MANUAL THERAPY 1/> REGIONS: CPT | Performed by: PHYSICAL THERAPIST

## 2018-02-14 PROCEDURE — 87798 DETECT AGENT NOS DNA AMP: CPT | Performed by: FAMILY MEDICINE

## 2018-02-14 RX ORDER — FLUTICASONE PROPIONATE 50 MCG
2 SPRAY, SUSPENSION (ML) NASAL DAILY
Qty: 1 BOTTLE | Refills: 0 | Status: SHIPPED | OUTPATIENT
Start: 2018-02-14 | End: 2018-03-17 | Stop reason: ALTCHOICE

## 2018-02-14 NOTE — PROGRESS NOTES
Assessment/Plan:    No problem-specific Assessment & Plan notes found for this encounter  Diagnoses and all orders for this visit:    Flu-like symptoms  -     Influenza A/B and RSV by PCR    Viral URI with cough  -     fluticasone (FLONASE) 50 mcg/act nasal spray; 2 sprays into each nostril daily for 7 days          Subjective:      Patient ID: Praveen Arteaga is a 29 y o  female  Patient presents with sinus congestion, dry cough that began last night, she had were sore throat this morning  No fever no chills no body aches no nausea  No chest tightness shortness of breath or wheezing  Cough is worse when she lies flat        The following portions of the patient's history were reviewed and updated as appropriate: current medications, past family history, past medical history, past social history, past surgical history and problem list     Review of Systems   Constitutional: Negative  HENT: Positive for congestion, postnasal drip, rhinorrhea and sore throat  Eyes: Negative  Respiratory: Positive for cough  Negative for chest tightness, shortness of breath and wheezing  Cardiovascular: Negative  Gastrointestinal: Negative  Musculoskeletal: Negative  Objective:    Vitals:    02/14/18 1305   BP: 118/68   Pulse: 98   Resp: 18   Temp: 99 5 °F (37 5 °C)   SpO2: 97%        Physical Exam   Constitutional: She appears well-nourished  No distress  HENT:   Head: Normocephalic and atraumatic  Right Ear: External ear normal    Left Ear: External ear normal    Mouth/Throat: Oropharynx is clear and moist  No oropharyngeal exudate  Mucosal erythema, edema and rhinorrhea, PND   Eyes: Conjunctivae are normal    Neck: Neck supple  Cardiovascular: Normal rate and regular rhythm  No murmur heard  Pulmonary/Chest: Effort normal and breath sounds normal  She has no wheezes  Abdominal: Soft  Lymphadenopathy:     She has no cervical adenopathy

## 2018-02-14 NOTE — PATIENT INSTRUCTIONS
Flonase nasal spray as directed  Increase fluids, recommend humidifier use  Alternate Tylenol Motrin as needed  Follow-up PCP if symptoms not improving 3-4 days  Influenza culture was done and sent will call if positive

## 2018-02-14 NOTE — LETTER
Dr Jovita Morel,  There has been a lapse in care > 2 weeks  Pt had made progress in therapy and will be d/c'd from skilled PT tx  Thank you       Sincerely,  Radha Farias, ALEJOT

## 2018-02-14 NOTE — PROGRESS NOTES
PT Evaluation     Today's date: 2018  Patient name: Lexie Keen  : 1985  MRN: [id number]  Referring provider: Tawnya Russ DO  Dx:   Encounter Diagnoses   Name Primary?  Acute bilateral low back pain without sciatica Yes    Cervicalgia     Acute pain of both shoulders     Pain of left hip joint                   Assessment  Impairments: abnormal or restricted ROM and impaired physical strength    Assessment details: Lexie Keen is a 29 y o  Female being treated as an outpatient at [facility] w/ c/o cervical, lumbar, b/l shoulder, and L hip pain s/p work accident  Since Ie, pt has made strong and steady gains  In pain reduction, ROM, and strength  Due to progress made in therapy, feel that it is appropriate for pt to be d/c'd from updated HEP  However, please advise as pt has not yet returned to working full duty  Thank you  Understanding of Dx/Px/POC: excellent  Goals  ST  Pain decreased by 25% in 4-6 weeks  MET  2  ROM increased by 25% in 4-6 weeks  PARTIALLY MET  3  Strength increased by 1/2 to 1 muscle grade in all deficient muscle groups in 4-6 PARTIALLY MET weeks  LT  Decrease pain to 1-2/10 at worst by d/c  PARTIALLY MET  2  Increase ROM to [facility] for all deficient movements by d/c  PARTIALLY MET  3  Strength increased to 5 for all deficient muscle groups by d/c  NOT MET  4  IADL performance increased to max function by d/c  MET  5  Recreational performance increased to max function by d/c  PARTIALLY MET  6  Pt will return to work pain-free by d/c   PARTIALLY MET (pt has not yet been cleared for full duty)      Plan  Planned modality interventions: cryotherapy  Other planned modality interventions: other modalities PRN  Planned therapy interventions: abdominal trunk stabilization, ADL retraining, IADL retraining, functional ROM exercises, graded exercise, home exercise program, manual therapy, neuromuscular re-education, patient education, postural training, strengthening, therapeutic activities, work reintegration, flexibility, joint mobilization and therapeutic exercise  Other planned therapy interventions: other interventions PRN  Frequency: Please advise  Duration in weeks: 2  Treatment plan discussed with: patient  Plan details: PLEASE ADVISE REGARDING CONTINUED PLAN OF CARE IF DEEMED NECESSARY FOR MORE TREATMENT        Subjective Evaluation    History of Present Illness  Date of onset: 2018    Mechanism of injury: trauma  Mechanism of injury: Pt back to normal routine at home- no difficulty  Pt is seeing referring physician this Friday- not yet cleared to RTW full duty, but is hoping that she will be cleared to return full duty this weekend  Not a recurrent problem Pain  Current pain ratin (0/10 in c/s, t/s, l/s, shoulders)  At worst pain ratin (only earlier this week- no pain since Monday; all other areas pain-free)  Location: lumbar spine  Alleviating factors: Pt reports no longer taking any medication for pain- discussed cont to use CP if necessary      Hand dominance: left      Diagnostic Tests  X-ray: normal (c/s and shoulder- no acute pathology as per pt)  Treatments  No previous or current treatments  Patient Goals  Patient goals for therapy: return to work          Objective     Active Range of Motion   Cervical/Thoracic Spine   Cervical    Subcranial retraction: WFL   Flexion: 68 degrees   Extension: 54 degrees   Left rotation: 65 degrees   Right rotation: 74 degrees   Left Shoulder   Flexion: 176 degrees   Abduction: 180 degrees   External rotation 90°: 90 degrees   Internal rotation 90°: 85 degrees     Right Shoulder   Flexion: 170 degrees   Abduction: 186 degrees   External rotation 90°: 90 degrees  Internal rotation 90°: WFL    Lumbar   Flexion: WFL  Extension: WFL  Left lateral flexion: WFL  Right lateral flexion: WFL  Left rotation: WFL  Right rotation: WFL  Left Hip   Flexion: 110 degrees   Extension: 16 degrees   Abduction: 40 degrees   Adduction: 24 degrees     Additional Active Range of Motion Details  Palpation: No tenderness  Palpation: Minimal hypertoncity/tenderness w/ palpation to lumbar paraspinals b/l       Passive Range of Motion   Left Hip   Flexion: 120 degrees     Strength/Myotome Testing     Left Shoulder     Planes of Motion   Flexion: 4+   Abduction: 4+   External rotation at 90°: 4+   Internal rotation at 90°: 4+     Right Shoulder     Planes of Motion   Flexion: 4+   Abduction: 4   External rotation at 90°: 4+   Internal rotation at 90°: 4+     Left Hip   Planes of Motion   Flexion: 4+  Extension: 4+  Abduction: 4+  External rotation: 4+ (*Pain in l/s)    Right Hip   Planes of Motion   Flexion: 4+  Extension: 4+  Abduction: 4+ (Mid lumbar spine pain)  External rotation: 4+    Additional Strength Details        General Comments     Hip Comments   Palpation: tenderness w/ palpation to hip flexor (equal to opposite side)  EPOC: 2/28  Precautions: None    Daily Treatment Diary     Manual  2/14 2/12   PROM to c/s, b/l shoulders;    Not stretching L hip due to lack of pain 8'        10'   GHJ mobs b/l  2'        2'   STM/TPR to L lateral hip NP        NP   IASTM to b/l t/s and l/s area 8'        8'   T/s p-->a mobs (gr III) performed by RS DPT 2'        2'   Progress note 7'               Exercise Diary  2/14 2/12   Bike 5'        5'   T/s rotations (s/l) 1-2" x 10 NP        1-2" x 10 ea   Supine AAROM shoulder flex 10"x 10 NP        10"x10   DLS: PPT W/ TE         W/ TE   DLS: bridges w/ hip ER (tb resist) 10" x 10 OTB NP        NP   LPD 2x15 NP        2x15 standing   DKTC w/ pball 30"x 3         30"x3   LTR 10"x10               Modalities  2/14            CP to l/s and t/s in prone (pt prefers) declined

## 2018-02-15 LAB
FLUAV AG SPEC QL: NORMAL
FLUBV AG SPEC QL: NORMAL
RSV B RNA SPEC QL NAA+PROBE: NORMAL

## 2018-02-16 ENCOUNTER — APPOINTMENT (OUTPATIENT)
Dept: PHYSICAL THERAPY | Facility: CLINIC | Age: 33
End: 2018-02-16
Payer: OTHER MISCELLANEOUS

## 2018-02-16 ENCOUNTER — APPOINTMENT (OUTPATIENT)
Dept: URGENT CARE | Facility: CLINIC | Age: 33
End: 2018-02-16
Payer: OTHER MISCELLANEOUS

## 2018-02-16 PROCEDURE — 99213 OFFICE O/P EST LOW 20 MIN: CPT

## 2018-02-17 ENCOUNTER — OFFICE VISIT (OUTPATIENT)
Dept: URGENT CARE | Facility: CLINIC | Age: 33
End: 2018-02-17
Payer: COMMERCIAL

## 2018-02-17 VITALS
WEIGHT: 257 LBS | BODY MASS INDEX: 42.77 KG/M2 | HEART RATE: 77 BPM | SYSTOLIC BLOOD PRESSURE: 119 MMHG | OXYGEN SATURATION: 97 % | DIASTOLIC BLOOD PRESSURE: 77 MMHG | RESPIRATION RATE: 16 BRPM

## 2018-02-17 DIAGNOSIS — J06.9 ACUTE UPPER RESPIRATORY INFECTION: Primary | ICD-10-CM

## 2018-02-17 PROCEDURE — G0382 LEV 3 HOSP TYPE B ED VISIT: HCPCS | Performed by: NURSE PRACTITIONER

## 2018-02-17 PROCEDURE — 99283 EMERGENCY DEPT VISIT LOW MDM: CPT | Performed by: NURSE PRACTITIONER

## 2018-02-17 RX ORDER — FLUCONAZOLE 150 MG/1
TABLET ORAL
Refills: 0 | COMMUNITY
Start: 2018-01-17 | End: 2018-03-17 | Stop reason: ALTCHOICE

## 2018-02-17 NOTE — LETTER
February 17, 2018     Patient: Avtar Lorenz   YOB: 1985   Date of Visit: 2/17/2018       To Whom It May Concern: It is my medical opinion that Avtar Lorenz may return to work on 02/18/2018  If you have any questions or concerns, please don't hesitate to call           Sincerely,        TRICIA Rodriguez    CC: No Recipients

## 2018-02-17 NOTE — PROGRESS NOTES
Assessment/Plan:    Acute upper respiratory infection  Symptoms consistent with upper respiratory infection, likely viral    Recommend supportive therapy  Increase fluid intake and get plenty of rest   Mucinex (guaifenesin) and cough drops as needed for cough  OTC Flonase or Nasonex as needed for congestion  Ibuprofen and acetaminophen as needed for pain or fever  Follow up as needed for new or worsening symptoms  Follow up with PCP in 4-5 days if no improvement  Diagnoses and all orders for this visit:    Acute upper respiratory infection    Other orders  -     fluconazole (DIFLUCAN) 150 mg tablet; take 1 tablet by mouth as a single dose IF SYMPTOMS PERSIST REPEAT IN 3 DAYS          Subjective:      Patient ID: Ramin Baker is a 29 y o  female  Pt was seen here 2/14 with similar symptoms  Influenza negative  Pt reports symptoms have continued  Last night she had difficulty sleeping due to coughing and temperature fluctuations, and was reports hallucinating  URI    This is a new problem  The current episode started in the past 7 days  The problem has been unchanged  There has been no fever  Associated symptoms include congestion, coughing, headaches, joint pain and rhinorrhea  Pertinent negatives include no abdominal pain, chest pain, diarrhea, dysuria, ear pain, joint swelling, nausea, neck pain, plugged ear sensation, rash, sinus pain, sneezing, sore throat, swollen glands, vomiting or wheezing  The following portions of the patient's history were reviewed and updated as appropriate: allergies, current medications, past family history, past medical history, past social history, past surgical history and problem list     Review of Systems   Constitutional: Positive for activity change, appetite change, chills, diaphoresis and fatigue  Negative for fever  HENT: Positive for congestion, postnasal drip, rhinorrhea and sinus pressure   Negative for ear pain, facial swelling, hearing loss, nosebleeds, sinus pain, sneezing and sore throat  Eyes: Negative  Respiratory: Positive for cough  Negative for chest tightness, shortness of breath and wheezing  Cardiovascular: Negative  Negative for chest pain  Gastrointestinal: Negative for abdominal pain, diarrhea, nausea and vomiting  Genitourinary: Negative for dysuria  Musculoskeletal: Positive for joint pain and myalgias  Negative for neck pain  Skin: Negative  Negative for rash  Neurological: Positive for headaches  Negative for dizziness and light-headedness  Objective:      /77   Pulse 77   Resp 16   Wt 117 kg (257 lb)   SpO2 97%   BMI 42 77 kg/m²          Physical Exam   Constitutional: She appears well-developed and well-nourished  No distress  HENT:   Head: Normocephalic and atraumatic  Right Ear: External ear normal    Left Ear: External ear normal    Nose: Nose normal    Mouth/Throat: Oropharynx is clear and moist  No oropharyngeal exudate  Eyes: Conjunctivae and EOM are normal  Pupils are equal, round, and reactive to light  Right eye exhibits no discharge  Left eye exhibits no discharge  No scleral icterus  Neck: Normal range of motion  Neck supple  No JVD present  No tracheal deviation present  No thyromegaly present  Cardiovascular: Normal rate, regular rhythm and normal heart sounds  No murmur heard  Pulmonary/Chest: Effort normal and breath sounds normal  No stridor  No respiratory distress  She has no wheezes  She has no rales  She exhibits no tenderness  Lymphadenopathy:     She has no cervical adenopathy  Skin: Skin is warm and dry  She is not diaphoretic

## 2018-02-17 NOTE — ASSESSMENT & PLAN NOTE
Symptoms consistent with upper respiratory infection, likely viral    Recommend supportive therapy  Increase fluid intake and get plenty of rest   Mucinex (guaifenesin) and cough drops as needed for cough  OTC Flonase or Nasonex as needed for congestion  Ibuprofen and acetaminophen as needed for pain or fever  Follow up as needed for new or worsening symptoms  Follow up with PCP in 4-5 days if no improvement

## 2018-02-17 NOTE — PATIENT INSTRUCTIONS
Symptoms consistent with upper respiratory infection, likely viral    Recommend supportive therapy  Increase fluid intake and get plenty of rest   Mucinex (guaifenesin) and cough drops as needed for cough  OTC Flonase or Nasonex as needed for congestion  Ibuprofen and acetaminophen as needed for pain or fever  Follow up as needed for new or worsening symptoms  Follow up with PCP in 4-5 days if no improvement  Upper Respiratory Infection   WHAT YOU NEED TO KNOW:   An upper respiratory infection is also called the common cold  It is an infection that can affect your nose, throat, ears, and sinuses  For healthy people, the common cold is usually not serious and does not need special treatment  Cold symptoms are usually worst for the first 3 to 5 days  Most people get better in 7 to 14 days  You may continue to cough for 2 to 3 weeks  Colds are caused by viruses and do not get better with antibiotics  DISCHARGE INSTRUCTIONS:   Return to the emergency department if:   · You have chest pain or trouble breathing  Contact your healthcare provider if:   · You have a fever over 102ºF (39°C)  · Your sore throat gets worse or you see white or yellow spots in your throat  · Your symptoms get worse after 3 to 5 days or your cold is not better in 14 days  · You have a rash anywhere on your skin  · You have large, tender lumps in your neck  · You have thick, green or yellow drainage from your nose  · You cough up thick yellow, green, or bloody mucus  · You have vomiting for more than 24 hours and cannot keep fluids down  · You have a bad earache  · You have questions or concerns about your condition or care  Medicines: You may need any of the following:  · Decongestants  help reduce nasal congestion and help you breathe more easily  If you take decongestant pills, they may make you feel restless or cause problems with your sleep   Do not use decongestant sprays for more than a few days     · Cough suppressants  help reduce coughing  Ask your healthcare provider which type of cough medicine is best for you  · NSAIDs , such as ibuprofen, help decrease swelling, pain, and fever  NSAIDs can cause stomach bleeding or kidney problems in certain people  If you take blood thinner medicine, always ask your healthcare provider if NSAIDs are safe for you  Always read the medicine label and follow directions  · Acetaminophen  decreases pain and fever  It is available without a doctor's order  Ask how much to take and how often to take it  Follow directions  Read the labels of all other medicines you are using to see if they also contain acetaminophen, or ask your doctor or pharmacist  Acetaminophen can cause liver damage if not taken correctly  Do not use more than 4 grams (4,000 milligrams) total of acetaminophen in one day  · Take your medicine as directed  Contact your healthcare provider if you think your medicine is not helping or if you have side effects  Tell him or her if you are allergic to any medicine  Keep a list of the medicines, vitamins, and herbs you take  Include the amounts, and when and why you take them  Bring the list or the pill bottles to follow-up visits  Carry your medicine list with you in case of an emergency  Follow up with your healthcare provider as directed:  Write down your questions so you remember to ask them during your visits  Self-care:   · Rest as much as possible  Slowly start to do more each day  · Drink more liquids as directed  Liquids will help thin and loosen mucus so you can cough it up  Liquids will also help prevent dehydration  Liquids that help prevent dehydration include water, fruit juice, and broth  Do not drink liquids that contain caffeine  Caffeine can increase your risk for dehydration  Ask your healthcare provider how much liquid to drink each day  · Soothe a sore throat  Gargle with warm salt water   This helps your sore throat feel better  Make salt water by dissolving ¼ teaspoon salt in 1 cup warm water  You may also suck on hard candy or throat lozenges  You may use a sore throat spray  · Use a humidifier or vaporizer  Use a cool mist humidifier or a vaporizer to increase air moisture in your home  This may make it easier for you to breathe and help decrease your cough  · Use saline nasal drops as directed  These help relieve congestion  · Apply petroleum-based jelly around the outside of your nostrils  This can decrease irritation from blowing your nose  · Do not smoke  Nicotine and other chemicals in cigarettes and cigars can make your symptoms worse  They can also cause infections such as bronchitis or pneumonia  Ask your healthcare provider for information if you currently smoke and need help to quit  E-cigarettes or smokeless tobacco still contain nicotine  Talk to your healthcare provider before you use these products  Prevent spreading your cold to others:   · Try to stay away from other people during the first 2 to 3 days of your cold when it is more easily spread  · Do not share food or drinks  · Do not share hand towels with household members  · Wash your hands often, especially after you blow your nose  Turn away from other people and cover your mouth and nose with a tissue when you sneeze or cough  © 2017 [address] Information is for End User's use only and may not be sold, redistributed or otherwise used for commercial purposes  All illustrations and images included in CareNotes® are the copyrighted property of A D [employer] M [employer]  or [employer]  The above information is an  only  It is not intended as medical advice for individual conditions or treatments  Talk to your doctor, nurse or pharmacist before following any medical regimen to see if it is safe and effective for you

## 2018-02-19 ENCOUNTER — APPOINTMENT (OUTPATIENT)
Dept: PHYSICAL THERAPY | Facility: CLINIC | Age: 33
End: 2018-02-19
Payer: OTHER MISCELLANEOUS

## 2018-03-17 ENCOUNTER — OFFICE VISIT (OUTPATIENT)
Dept: URGENT CARE | Age: 33
End: 2018-03-17
Payer: COMMERCIAL

## 2018-03-17 VITALS
RESPIRATION RATE: 18 BRPM | HEIGHT: 65 IN | WEIGHT: 260 LBS | HEART RATE: 72 BPM | TEMPERATURE: 98.7 F | SYSTOLIC BLOOD PRESSURE: 130 MMHG | BODY MASS INDEX: 43.32 KG/M2 | OXYGEN SATURATION: 97 % | DIASTOLIC BLOOD PRESSURE: 80 MMHG

## 2018-03-17 DIAGNOSIS — R10.32 LEFT LOWER QUADRANT PAIN: Primary | ICD-10-CM

## 2018-03-17 DIAGNOSIS — R35.0 FREQUENCY OF MICTURITION: ICD-10-CM

## 2018-03-17 LAB
SL AMB  POCT GLUCOSE, UA: ABNORMAL
SL AMB LEUKOCYTE ESTERASE,UA: ABNORMAL
SL AMB POCT BILIRUBIN,UA: ABNORMAL
SL AMB POCT BLOOD,UA: ABNORMAL
SL AMB POCT CLARITY,UA: CLEAR
SL AMB POCT COLOR,UA: ABNORMAL
SL AMB POCT KETONES,UA: ABNORMAL
SL AMB POCT NITRITE,UA: ABNORMAL
SL AMB POCT PH,UA: 6
SL AMB POCT SPECIFIC GRAVITY,UA: 1
SL AMB POCT URINE PROTEIN: ABNORMAL
SL AMB POCT UROBILINOGEN: 0.2

## 2018-03-17 PROCEDURE — G0382 LEV 3 HOSP TYPE B ED VISIT: HCPCS | Performed by: FAMILY MEDICINE

## 2018-03-17 PROCEDURE — 81002 URINALYSIS NONAUTO W/O SCOPE: CPT | Performed by: FAMILY MEDICINE

## 2018-03-17 PROCEDURE — 99283 EMERGENCY DEPT VISIT LOW MDM: CPT | Performed by: FAMILY MEDICINE

## 2018-03-17 NOTE — PATIENT INSTRUCTIONS
Referred to [facility] ER for further evaluation and treatment  Needs w/u for LLQ abdominal pain  Stable to go by car  Transfer center called by myself

## 2018-03-17 NOTE — PROGRESS NOTES
[facility]        NAME: Jl Lorenz is a 29 y o  female  : 1985    MRN: [id number]  DATE: 2018  TIME: 12:44 PM    Assessment and Plan   Left lower quadrant pain [R10 32]  1  Left lower quadrant pain     2  Frequency of micturition  POCT urine dip         Patient Instructions   Referred to [facility] ER for further evaluation and treatment  Needs w/u for LLQ abdominal pain  Stable to go by car  Transfer center called by myself  Chief Complaint     Chief Complaint   Patient presents with    Abdominal Pain      LLQ pain,since yesterday and urinary frequency X 3 days         History of Present Illness       28-year-old female presents with complaints of left lower quadrant abdominal pain since yesterday, worsening in nature  Pain is constant  No fevers  + nausea and constipation  Has had increased gas  No history of same  Has had increased urinary frequency, states she has been drinking a large amount of water  Review of Systems   Review of Systems   Constitutional: Negative for fever  HENT: Negative  Gastrointestinal: Positive for abdominal pain, constipation and nausea  Negative for diarrhea and vomiting  Genitourinary: Positive for frequency  Negative for dysuria  All other systems reviewed and are negative  Current Medications     No current outpatient prescriptions on file      Current Allergies     Allergies as of 2018 - Reviewed 2018   Allergen Reaction Noted    Sulfa antibiotics Rash 2018    Latex  2016    Sulfamethoxazole-trimethoprim  2016            The following portions of the patient's history were reviewed and updated as appropriate: allergies, current medications, past family history, past medical history, past social history, past surgical history and problem list    Past Medical History:   Diagnosis Date    History of atrial fibrillation     Pt reports was stress related and resolved     Past Surgical History:   Procedure Laterality Date    MOUTH SURGERY      OTHER SURGICAL HISTORY      jaw surgery           Objective   /80 (BP Location: Left arm, Patient Position: Sitting, Cuff Size: Standard)   Pulse 72   Temp 98 7 °F (37 1 °C) (Temporal)   Resp 18   Ht 5' 5" (1 651 m)   Wt 118 kg (260 lb)   LMP 02/25/2018   SpO2 97%   BMI 43 27 kg/m²        Physical Exam     Physical Exam   Constitutional: She is oriented to person, place, and time  She appears well-developed and well-nourished  Cardiovascular: Normal rate, regular rhythm and normal heart sounds  Pulmonary/Chest: Effort normal and breath sounds normal    Abdominal: There is tenderness in the left lower quadrant  There is no rigidity, no rebound and no guarding  Neurological: She is alert and oriented to person, place, and time  Psychiatric: She has a normal mood and affect  Her behavior is normal    Nursing note and vitals reviewed  Urine dipstick shows negative for all components, positive for small leukocytes

## 2018-04-17 ENCOUNTER — APPOINTMENT (OUTPATIENT)
Dept: URGENT CARE | Facility: CLINIC | Age: 33
End: 2018-04-17
Payer: OTHER MISCELLANEOUS

## 2018-04-17 PROCEDURE — 99203 OFFICE O/P NEW LOW 30 MIN: CPT

## 2018-04-22 ENCOUNTER — APPOINTMENT (OUTPATIENT)
Dept: URGENT CARE | Facility: CLINIC | Age: 33
End: 2018-04-22
Payer: OTHER MISCELLANEOUS

## 2018-04-22 PROCEDURE — 99215 OFFICE O/P EST HI 40 MIN: CPT | Performed by: FAMILY MEDICINE

## 2018-05-01 ENCOUNTER — APPOINTMENT (OUTPATIENT)
Dept: URGENT CARE | Facility: CLINIC | Age: 33
End: 2018-05-01
Payer: OTHER MISCELLANEOUS

## 2018-05-01 PROCEDURE — 99213 OFFICE O/P EST LOW 20 MIN: CPT | Performed by: FAMILY MEDICINE

## 2018-05-08 ENCOUNTER — APPOINTMENT (OUTPATIENT)
Dept: URGENT CARE | Facility: CLINIC | Age: 33
End: 2018-05-08
Payer: OTHER MISCELLANEOUS

## 2018-05-08 ENCOUNTER — APPOINTMENT (OUTPATIENT)
Dept: RADIOLOGY | Facility: CLINIC | Age: 33
End: 2018-05-08
Payer: OTHER MISCELLANEOUS

## 2018-05-08 DIAGNOSIS — T14.90XA INJURY: ICD-10-CM

## 2018-05-08 PROCEDURE — 99214 OFFICE O/P EST MOD 30 MIN: CPT | Performed by: FAMILY MEDICINE

## 2018-05-08 PROCEDURE — 73110 X-RAY EXAM OF WRIST: CPT

## 2018-05-08 PROCEDURE — 73090 X-RAY EXAM OF FOREARM: CPT

## 2018-07-02 ENCOUNTER — OFFICE VISIT (OUTPATIENT)
Dept: URGENT CARE | Facility: CLINIC | Age: 33
End: 2018-07-02
Payer: COMMERCIAL

## 2018-07-02 VITALS
OXYGEN SATURATION: 97 % | WEIGHT: 260 LBS | TEMPERATURE: 101.7 F | SYSTOLIC BLOOD PRESSURE: 130 MMHG | RESPIRATION RATE: 16 BRPM | DIASTOLIC BLOOD PRESSURE: 78 MMHG | HEIGHT: 65 IN | HEART RATE: 120 BPM | BODY MASS INDEX: 43.32 KG/M2

## 2018-07-02 DIAGNOSIS — J03.90 ACUTE TONSILLITIS, UNSPECIFIED ETIOLOGY: Primary | ICD-10-CM

## 2018-07-02 PROCEDURE — 99283 EMERGENCY DEPT VISIT LOW MDM: CPT | Performed by: EMERGENCY MEDICINE

## 2018-07-02 PROCEDURE — G0382 LEV 3 HOSP TYPE B ED VISIT: HCPCS | Performed by: EMERGENCY MEDICINE

## 2018-07-02 RX ORDER — ACETAMINOPHEN 325 MG/1
650 TABLET ORAL ONCE
Status: COMPLETED | OUTPATIENT
Start: 2018-07-02 | End: 2018-07-02

## 2018-07-02 RX ORDER — AMOXICILLIN 500 MG/1
500 CAPSULE ORAL 3 TIMES DAILY
Qty: 30 CAPSULE | Refills: 0 | Status: SHIPPED | OUTPATIENT
Start: 2018-07-02 | End: 2018-07-12

## 2018-07-02 RX ADMIN — ACETAMINOPHEN 650 MG: 325 TABLET ORAL at 14:58

## 2018-07-02 NOTE — PROGRESS NOTES
Over the last 3 days this patient has developed sore throat and fever  No rhinorrhea no cough  No nausea vomiting  Patient is alert oriented pleasant and in no distress  She does not appear toxic  She does have a fever at the present time  Pupils equal react to light sclerae white conjunctiva pink  Nose is clear  Throat shows moderately enlarged tonsils with whitish exudate  No peritonsillar abscess  Neck is supple with anterior cervical nodes  Lungs are clear  TMs are normal  No skin rash    1  Acute tonsillitis, unspecified etiology  acetaminophen (TYLENOL) tablet 650 mg    amoxicillin (AMOXIL) 500 mg capsule

## 2018-07-06 ENCOUNTER — OFFICE VISIT (OUTPATIENT)
Dept: URGENT CARE | Facility: CLINIC | Age: 33
End: 2018-07-06
Payer: COMMERCIAL

## 2018-07-06 VITALS
TEMPERATURE: 97.8 F | HEART RATE: 98 BPM | DIASTOLIC BLOOD PRESSURE: 80 MMHG | OXYGEN SATURATION: 99 % | WEIGHT: 260 LBS | HEIGHT: 65 IN | BODY MASS INDEX: 43.32 KG/M2 | RESPIRATION RATE: 16 BRPM | SYSTOLIC BLOOD PRESSURE: 122 MMHG

## 2018-07-06 DIAGNOSIS — J03.90 ACUTE TONSILLITIS, UNSPECIFIED ETIOLOGY: Primary | ICD-10-CM

## 2018-07-06 PROCEDURE — G0382 LEV 3 HOSP TYPE B ED VISIT: HCPCS | Performed by: EMERGENCY MEDICINE

## 2018-07-06 RX ORDER — PREDNISONE 20 MG/1
TABLET ORAL
Qty: 10 TABLET | Refills: 0 | Status: SHIPPED | OUTPATIENT
Start: 2018-07-06 | End: 2019-01-23

## 2018-07-06 NOTE — PROGRESS NOTES
This patient was seen by me 4 days ago with a history of sore throat and fever  She had inflamed tonsils with some exudate at that time and was treated with amoxicillin for tonsillitis  She comes in today stating that she has not had any improvement  Her fever seems to have subsided  No coughing or nasal congestion  Her tonsils are a little bit larger than they were 4 days ago with some a little bit more exudate  There is no peritonsillar abscess  She does have anterior cervical lymphadenopathy  No airway issues  She speaks in a normal voice  She is able to take liquids  She does not appear toxic  She does not appear to be in distress  We discussed the possibilities including a viral etiology is a possibility  We also mention the possibility of mononucleosis to be tested for she does not improve  Will prescribe a brief course of prednisone for symptoms    1  Acute tonsillitis, unspecified etiology

## 2018-09-25 ENCOUNTER — OFFICE VISIT (OUTPATIENT)
Dept: URGENT CARE | Facility: CLINIC | Age: 33
End: 2018-09-25
Payer: COMMERCIAL

## 2018-09-25 VITALS
TEMPERATURE: 98.7 F | WEIGHT: 275 LBS | DIASTOLIC BLOOD PRESSURE: 86 MMHG | RESPIRATION RATE: 20 BRPM | SYSTOLIC BLOOD PRESSURE: 108 MMHG | HEIGHT: 65 IN | BODY MASS INDEX: 45.82 KG/M2 | HEART RATE: 92 BPM

## 2018-09-25 DIAGNOSIS — J02.9 SORE THROAT: ICD-10-CM

## 2018-09-25 DIAGNOSIS — J06.9 VIRAL URI WITH COUGH: Primary | ICD-10-CM

## 2018-09-25 LAB — S PYO AG THROAT QL: NEGATIVE

## 2018-09-25 PROCEDURE — 87430 STREP A AG IA: CPT | Performed by: PHYSICIAN ASSISTANT

## 2018-09-25 PROCEDURE — 99283 EMERGENCY DEPT VISIT LOW MDM: CPT | Performed by: PHYSICIAN ASSISTANT

## 2018-09-25 PROCEDURE — G0382 LEV 3 HOSP TYPE B ED VISIT: HCPCS | Performed by: PHYSICIAN ASSISTANT

## 2018-09-25 NOTE — PROGRESS NOTES
[facility]        NAME: Ruta Bumpers is a 34 y o  female  : 1985    MRN: [id number]  DATE: 2018  TIME: 10:20 AM    Assessment and Plan   Viral URI with cough [J06 9, B97 89]  1  Viral URI with cough     2  Sore throat  POCT rapid strepA    Throat culture         Patient Instructions     Rapid strep negative in office, will send for culture and call if positive  Sudafed (pseudoephedrine), saline nasal spray, tylenol, Claritin, Mucinex D, and Robotussin DM for symptomatic relief  [place] diet- Bananas, rice, applesauce, and toast  Advance diet as tolerated  Push lots of fluids to stay hydrated  Watch for any fevers, uncontrolled vomiting/diarrhea, and dehydration  Follow up with PCP in 3-5 days  Proceed to  ER if symptoms worsen  Chief Complaint     Chief Complaint   Patient presents with    Earache     Patient states she has been sick for 3 days and is getting progressively worse  Bilateral ear pain    Sore Throat     Sore throat that also started 3 days ago; occasional difficulty swallowing    Diarrhea     Diarrhea and vomitting since yesterday  History of Present Illness       This is a 34year old female presenting for URI symptoms x 3 days  She reports sore throat, bilateral ear pain, rhinorrhea, sinus congestion  Over the last 2 days she has had one episode of emesis daily, without nausea  She also has had watery diarrhea for the past 2 days  No fevers, abdominal pain, cough, hematochezia  She continues to eat and drink well  She took Tylenol but otherwise no medications for this  She is currently 14 weeks pregnant  Review of Systems   Review of Systems   Constitutional: Positive for fatigue  Negative for chills and fever  HENT: Positive for congestion, ear pain, postnasal drip, rhinorrhea, sinus pressure and sore throat  Negative for ear discharge  Respiratory: Negative for cough and shortness of breath      Gastrointestinal: Positive for diarrhea and vomiting  Negative for abdominal pain, blood in stool and nausea  Musculoskeletal: Negative for myalgias  Skin: Negative for rash  Neurological: Negative for dizziness, light-headedness and headaches  Current Medications       Current Outpatient Prescriptions:     Prenatal MV-Min-Fe Fum-FA-DHA (PRENATAL+DHA PO), Take by mouth, Disp: , Rfl:     predniSONE 20 mg tablet, 2 tablets daily for 5 days (Patient not taking: Reported on 9/25/2018 ), Disp: 10 tablet, Rfl: 0    Current Allergies     Allergies as of 09/25/2018 - Reviewed 09/25/2018   Allergen Reaction Noted    Sulfa antibiotics Rash 01/26/2018    Latex  09/27/2016    Sulfamethoxazole-trimethoprim  09/27/2016            The following portions of the patient's history were reviewed and updated as appropriate: allergies, current medications, past family history, past medical history, past social history, past surgical history and problem list      Past Medical History:   Diagnosis Date    History of atrial fibrillation     Pt reports was stress related and resolved       Past Surgical History:   Procedure Laterality Date    MOUTH SURGERY      OTHER SURGICAL HISTORY      jaw surgery       Family History   Problem Relation Age of Onset    Cancer Maternal Grandmother     Cancer Maternal Grandfather     Diabetes Maternal Grandfather          Medications have been verified  Objective   /86   Pulse 92   Temp 98 7 °F (37 1 °C)   Resp 20   Ht 5' 5" (1 651 m)   Wt 125 kg (275 lb)   LMP 06/15/2018   Breastfeeding? No   BMI 45 76 kg/m²        Physical Exam     Physical Exam   Constitutional: She appears well-developed and well-nourished  No distress  HENT:   Head: Normocephalic and atraumatic  Right Ear: External ear and ear canal normal  A middle ear effusion is present  Left Ear: External ear and ear canal normal  A middle ear effusion is present  Nose: Mucosal edema and rhinorrhea present     Mouth/Throat: Uvula is midline and mucous membranes are normal  Posterior oropharyngeal edema present  No oropharyngeal exudate or posterior oropharyngeal erythema  Eyes: Conjunctivae are normal  Pupils are equal, round, and reactive to light  Neck: Normal range of motion  Neck supple  Cardiovascular: Normal rate, regular rhythm and normal heart sounds  Pulmonary/Chest: Effort normal and breath sounds normal  No respiratory distress  She has no decreased breath sounds  She has no wheezes  She has no rhonchi  She has no rales  Abdominal: Soft  Bowel sounds are normal  She exhibits no distension and no mass  There is no tenderness  There is no rebound and no guarding  Lymphadenopathy:     She has no cervical adenopathy  Neurological: She is alert  Skin: Skin is warm and dry  She is not diaphoretic  Nursing note and vitals reviewed

## 2018-09-25 NOTE — PATIENT INSTRUCTIONS
Rapid strep negative in office, will send for culture and call if positive  Sudafed (pseudoephedrine), saline nasal spray, tylenol, Claritin, Mucinex D, and Robotussin DM for symptomatic relief  [place] diet- Bananas, rice, applesauce, and toast  Advance diet as tolerated  Push lots of fluids to stay hydrated  Watch for any fevers, uncontrolled vomiting/diarrhea, and dehydration  Follow up with your PCP for persistent symptoms  Go to the ER for any distress

## 2018-09-27 LAB — B-HEM STREP SPEC QL CULT: NEGATIVE

## 2019-01-10 ENCOUNTER — OFFICE VISIT (OUTPATIENT)
Dept: URGENT CARE | Facility: CLINIC | Age: 34
End: 2019-01-10
Payer: COMMERCIAL

## 2019-01-10 VITALS
HEIGHT: 65 IN | OXYGEN SATURATION: 96 % | TEMPERATURE: 99.6 F | RESPIRATION RATE: 18 BRPM | SYSTOLIC BLOOD PRESSURE: 131 MMHG | BODY MASS INDEX: 48.32 KG/M2 | DIASTOLIC BLOOD PRESSURE: 82 MMHG | WEIGHT: 290 LBS | HEART RATE: 99 BPM

## 2019-01-10 DIAGNOSIS — R30.0 DYSURIA: Primary | ICD-10-CM

## 2019-01-10 LAB
SL AMB  POCT GLUCOSE, UA: NEGATIVE
SL AMB LEUKOCYTE ESTERASE,UA: NEGATIVE
SL AMB POCT BILIRUBIN,UA: NEGATIVE
SL AMB POCT BLOOD,UA: NEGATIVE
SL AMB POCT CLARITY,UA: CLEAR
SL AMB POCT COLOR,UA: YELLOW
SL AMB POCT KETONES,UA: NEGATIVE
SL AMB POCT NITRITE,UA: NEGATIVE
SL AMB POCT PH,UA: 6
SL AMB POCT SPECIFIC GRAVITY,UA: 1.01
SL AMB POCT URINE PROTEIN: NEGATIVE
SL AMB POCT UROBILINOGEN: 0.2

## 2019-01-10 PROCEDURE — 99283 EMERGENCY DEPT VISIT LOW MDM: CPT | Performed by: PREVENTIVE MEDICINE

## 2019-01-10 PROCEDURE — G0382 LEV 3 HOSP TYPE B ED VISIT: HCPCS | Performed by: PREVENTIVE MEDICINE

## 2019-01-10 NOTE — PATIENT INSTRUCTIONS
It does not appear the have an active urinary infection  Call in 2 days for the results of the culture  Try getting some Monistat cream applied around the urethral area

## 2019-01-10 NOTE — PROGRESS NOTES
[facility]        NAME: Avtar Lorenz is a 34 y o  female  : 1985    MRN: [id number]  DATE: January 10, 2019  TIME: 5:09 PM    Assessment and Plan   Dysuria [R30 0]  1  Dysuria  POCT urine dip auto non-scope         Patient Instructions       Follow up with PCP in 3-5 days  Proceed to  ER if symptoms worsen  Chief Complaint     Chief Complaint   Patient presents with    Possible UTI     pt is pregnant  Complains of pain and urine frequency since yesterday         History of Present Illness       Six months pregnant  Increasing dysuria and frequency last 2-3 days  Denies back pain or fever  Review of Systems   Review of Systems   Genitourinary: Positive for dysuria and frequency  Negative for flank pain  Current Medications       Current Outpatient Prescriptions:     Prenatal MV-Min-Fe Fum-FA-DHA (PRENATAL+DHA PO), Take by mouth, Disp: , Rfl:     predniSONE 20 mg tablet, 2 tablets daily for 5 days (Patient not taking: Reported on 2018 ), Disp: 10 tablet, Rfl: 0    Current Allergies     Allergies as of 01/10/2019 - Reviewed 2018   Allergen Reaction Noted    Sulfa antibiotics Rash 2018    Latex  2016    Sulfamethoxazole-trimethoprim  2016            The following portions of the patient's history were reviewed and updated as appropriate: allergies, current medications, past family history, past medical history, past social history, past surgical history and problem list      Past Medical History:   Diagnosis Date    History of atrial fibrillation     Pt reports was stress related and resolved       Past Surgical History:   Procedure Laterality Date    MOUTH SURGERY      OTHER SURGICAL HISTORY      jaw surgery       Family History   Problem Relation Age of Onset    Cancer Maternal Grandmother     Cancer Maternal Grandfather     Diabetes Maternal Grandfather          Medications have been verified          Objective   /82   Pulse 99 Temp 99 6 °F (37 6 °C)   Resp 18   Ht 5' 5" (1 651 m)   Wt 132 kg (290 lb)   LMP 06/15/2018   SpO2 96%   BMI 48 26 kg/m²        Physical Exam     Physical Exam   Genitourinary:   Genitourinary Comments: No CVA tenderness to percussion     Urinalysis shows no red cells or white cells

## 2019-01-12 LAB
BACTERIA UR CULT: NORMAL
Lab: NO GROWTH

## 2019-01-15 ENCOUNTER — TRANSCRIBE ORDERS (OUTPATIENT)
Dept: PERINATAL CARE | Facility: CLINIC | Age: 34
End: 2019-01-15

## 2019-01-15 ENCOUNTER — OFFICE VISIT (OUTPATIENT)
Dept: PERINATAL CARE | Facility: CLINIC | Age: 34
End: 2019-01-15
Payer: COMMERCIAL

## 2019-01-15 DIAGNOSIS — O99.810 ABNORMAL GLUCOSE TOLERANCE TEST DURING PREGNANCY, ANTEPARTUM: ICD-10-CM

## 2019-01-15 DIAGNOSIS — O99.810 ABNORMAL GLUCOSE TOLERANCE TEST DURING PREGNANCY, ANTEPARTUM: Primary | ICD-10-CM

## 2019-01-15 DIAGNOSIS — O24.410 DIET CONTROLLED GESTATIONAL DIABETES MELLITUS (GDM) IN THIRD TRIMESTER: Primary | ICD-10-CM

## 2019-01-15 PROCEDURE — G0109 DIAB MANAGE TRN IND/GROUP: HCPCS

## 2019-01-15 NOTE — PROGRESS NOTES
Date:  01/15/19  RE: Leopold Penman    : 1985  MACHO: Estimated Date of Delivery: 3/24/19  EGA: 30w2d             Dear Dr aSlly Lenz at [facility] :   Thank you for referring your patient to the Diabetes and Pregnancy Program at [address] [place] [address]  The patient attended Class 1 received the following education:     Pathophysiology of diabetes and pregnancy  This includes maternal-fetal complications such as fetal macrosomia,  hypoglycemia, polyhydramnios, increased incidence of  section, pre-term labor and in severe cases, fetal demise and stillbirth   Self-monitoring of blood glucose levels: fasting (goal 60mg/dl to 90mg/dl) and two hours after the start of the meal less (goal less than 120mg/dl)  The patient was provided with a Accu-chek Guide blood glucose meter and supplies   Medical Nutrition Therapy for diabetes and pregnancy  The patient was provided with a 2200 calorie gestational diabetes meal plan and the following was reviewed:     o Basic review of macronutrients   o Meal pattern should consist of three small meals and three snacks daily  o Carbohydrate gram amounts per meal   o Instructions on how to read a food label  o Appropriate serving sizes for carbohydrates and proteins  o Incorporating protein at each meal and snack  o Maintain a three day food diary and bring to class 2    [facility] weekly or as directed:  o Phone : [phone]  If no response in 24 hours, call [phone]   o Fax: [phone]  o Email: [email or web address]  The patient is scheduled to attend class 2 on 19  Additionally, fetal ultrasound evaluation by the Perinatologist has been scheduled to assure continuity of care  Please contact the Diabetes and Pregnancy Program at [phone] if you have any questions    Time spent with patient [phone]; time spent face to face counseling greater than 50% of the appointment      Sincerely,   Baldev Pineda, RD,LDN,CDE  Diabetes Educator   Diabetes and Pregnancy Program

## 2019-01-16 RX ORDER — BLOOD SUGAR DIAGNOSTIC
STRIP MISCELLANEOUS
Qty: 100 EACH | Refills: 3 | Status: SHIPPED | OUTPATIENT
Start: 2019-01-16 | End: 2021-04-20 | Stop reason: ALTCHOICE

## 2019-01-16 RX ORDER — LANCETS
EACH MISCELLANEOUS
Qty: 102 EACH | Refills: 3 | Status: SHIPPED | OUTPATIENT
Start: 2019-01-16 | End: 2021-04-20 | Stop reason: ALTCHOICE

## 2019-01-18 DIAGNOSIS — O24.410 DIET CONTROLLED GESTATIONAL DIABETES MELLITUS (GDM) IN THIRD TRIMESTER: Primary | ICD-10-CM

## 2019-01-18 RX ORDER — BLOOD-GLUCOSE METER
EACH MISCELLANEOUS 4 TIMES DAILY
Qty: 1 KIT | Refills: 0 | Status: SHIPPED | OUTPATIENT
Start: 2019-01-18 | End: 2021-04-20 | Stop reason: ALTCHOICE

## 2019-01-22 ENCOUNTER — OB ABSTRACT (OUTPATIENT)
Dept: PERINATAL CARE | Facility: CLINIC | Age: 34
End: 2019-01-22

## 2019-01-22 ENCOUNTER — OFFICE VISIT (OUTPATIENT)
Dept: PERINATAL CARE | Facility: CLINIC | Age: 34
End: 2019-01-22
Payer: COMMERCIAL

## 2019-01-22 DIAGNOSIS — O24.410 DIET CONTROLLED GESTATIONAL DIABETES MELLITUS (GDM) IN THIRD TRIMESTER: Primary | ICD-10-CM

## 2019-01-22 PROCEDURE — G0109 DIAB MANAGE TRN IND/GROUP: HCPCS

## 2019-01-22 NOTE — PROGRESS NOTES
DATE:  19  RE: Avtar Lorenz    : 1985    MACHO: Estimated Date of Delivery: 3/24/19    EGA: 31w2d    Dear Dr Gibson Smart at [facility]:    Thank you for referring your patient to the Diabetes and Pregnancy Program at [address] [place] [address]  The patient attended Class 2 and received the following education:    Weight gain during in pregnancy  Based on the patients height of 65 inches, pre-pregnancy weight of 265 pounds, 44 09 BMI  we would recommend a total weight gain of 11-20 pounds for the pregnancy   The patients current weight is 288 pounds, and her weight gain to date is 23 pounds  Based on this, we recommend minimal weight gain for the remainder of the pregnancy   Medical Nutrition Therapy for diabetes and pregnancy  The patients three day food diary was reviewed and discussed  The patient was instructed on the following:  o Individualized meal plan    o Use of food diary to maintain a meal plan    o Importance of protein as it relates to blood glucose control   Review of blood glucose log  Reinforcement of blood glucose goals and reporting guidelines   Ultrasounds every four weeks in the [address] to evaluate fetal growth   Exercise Guidelines:   o Walking up to thirty minutes daily can reduce blood glucose levels  o Monitor for greater than four contractions per hour     o The patient has been instructed not to begin physical activity if she has been instructed not to exercise by your office   Sick day guidelines and hypoglycemia with treatment   Post-partum guidelines:  o Completion of a 75 gram glucose tolerance test at 6 weeks post-partum to check for type 2 diabetes  o 20% weight loss and 30 minutes of exercise 5 times per week reduces the risk of type 2 diabetes   Breastfeeding guidelines   Report blood glucose levels to [facility] weekly or as directed   Insulin therapy was discussed following class today  Patient will trial protein only bedtime snack and will report on Thursday, 1/24  o Phone: [phone]  If no response in 24 hours, call [phone]    o Fax: [phone]  o Email: [email or web address]    Please contact the Diabetes and Pregnancy Program at [phone] if you have questions  Time spent with patient [phone]; time spent face to face counseling greater than 50% of the appointment      Sincerely,     Joss Watts  Diabetes Educator  Diabetes and Pregnancy Program

## 2019-01-22 NOTE — PROGRESS NOTES
Date:  19  RE: Stephany Persaud    : 1985  Estimated Date of Delivery: 3/24/19  EGA: 31w2d  OB/GYN: Rolanda Lake at [facility]          Current regimen:  2200 calorie gestational diabetes meal plan; 3 meals and 3 snacks including protein  Patient reported that she has been having difficulty following the meal plan  Self-monitoring blood glucose 4 times per day with an Accu-chek blood glucose monitor  Plan:  Patient is scheduled for Class 2 today  Continue diabetes education today with more discussion regarding diet  Instructed patient to change bedtime snack to 2-3 ounces of protein and no carbohydrate foods  Examples of protein only snacks were reviewed  Encouraged walking 20-30 minutes after dinner daily if there are no activity restrictions from OB  Advised patient that insulin therapy is recommended to maintain fasting blood sugars closer to target range more consistently  Date due to report next:  Monday, 19      Lady Wing RD,LDN,CDE  Diabetes Educator   Diabetes and Pregnancy Program

## 2019-01-23 ENCOUNTER — ROUTINE PRENATAL (OUTPATIENT)
Dept: PERINATAL CARE | Facility: CLINIC | Age: 34
End: 2019-01-23
Payer: COMMERCIAL

## 2019-01-23 ENCOUNTER — OFFICE VISIT (OUTPATIENT)
Dept: URGENT CARE | Facility: CLINIC | Age: 34
End: 2019-01-23
Payer: COMMERCIAL

## 2019-01-23 VITALS
SYSTOLIC BLOOD PRESSURE: 128 MMHG | OXYGEN SATURATION: 97 % | HEIGHT: 65 IN | DIASTOLIC BLOOD PRESSURE: 86 MMHG | WEIGHT: 289 LBS | BODY MASS INDEX: 48.15 KG/M2 | HEART RATE: 127 BPM | TEMPERATURE: 99.3 F | RESPIRATION RATE: 16 BRPM

## 2019-01-23 VITALS
WEIGHT: 289 LBS | SYSTOLIC BLOOD PRESSURE: 133 MMHG | BODY MASS INDEX: 48.15 KG/M2 | DIASTOLIC BLOOD PRESSURE: 61 MMHG | HEIGHT: 65 IN | HEART RATE: 111 BPM

## 2019-01-23 DIAGNOSIS — O24.410 DIET CONTROLLED GESTATIONAL DIABETES MELLITUS (GDM) IN THIRD TRIMESTER: ICD-10-CM

## 2019-01-23 DIAGNOSIS — O99.210 OBESITY AFFECTING PREGNANCY, ANTEPARTUM: Primary | ICD-10-CM

## 2019-01-23 DIAGNOSIS — J00 ACUTE NASOPHARYNGITIS: Primary | ICD-10-CM

## 2019-01-23 DIAGNOSIS — Z3A.31 31 WEEKS GESTATION OF PREGNANCY: ICD-10-CM

## 2019-01-23 PROCEDURE — G0382 LEV 3 HOSP TYPE B ED VISIT: HCPCS | Performed by: PHYSICIAN ASSISTANT

## 2019-01-23 PROCEDURE — 99283 EMERGENCY DEPT VISIT LOW MDM: CPT | Performed by: PHYSICIAN ASSISTANT

## 2019-01-23 PROCEDURE — 99241 PR OFFICE CONSULTATION NEW/ESTAB PATIENT 15 MIN: CPT | Performed by: OBSTETRICS & GYNECOLOGY

## 2019-01-23 PROCEDURE — 76811 OB US DETAILED SNGL FETUS: CPT | Performed by: OBSTETRICS & GYNECOLOGY

## 2019-01-23 NOTE — PATIENT INSTRUCTIONS
Increase fluids and rest  Tylenol for pain  Guaifenesin for cough  Claritin  Follow-up with OBGYN tomorrow as planned for better control of blood sugar   follow-up with PCP if no improvement in 3-5 days  If anything changes or worsens follow-up sooner  Call in 2 days for culture results

## 2019-01-23 NOTE — PROGRESS NOTES
NAME: Alex Wang is a 34 y o  female  : 1985    MRN: [id number]      Assessment and Plan   Acute nasopharyngitis [J00]  1  Acute nasopharyngitis           Rapid strep negative, culture sent    Patient Instructions   Patient Instructions   Increase fluids and rest  Tylenol for pain  Guaifenesin for cough  Claritin  Follow-up with OBGYN tomorrow as planned for better control of blood sugar   follow-up with PCP if no improvement in 3-5 days  If anything changes or worsens follow-up sooner  Call in 2 days for culture results    Proceed to ER if symptoms worsen  Chief Complaint     Chief Complaint   Patient presents with    Nasal Congestion     x2 days, b/l ear fullness and pain; pain 5/10         History of Present Illness   Patient who is currently 31 weeks and 3 days pregnant with gestational diabetes presents complaining of bilateral ear pain and fullness, sore throat and congestion x2 days  She reports she has been coughing and has not increased production of mucus  She denies any fever, chills, chest tightness, sinus pain or pressure, shortness of breath or dyspnea or body aches  She reports she starts using insulin tomorrow for her gestational diabetes  She has not taken anything over-the-counter for her symptoms        Review of Systems   Review of Systems   Constitutional: Negative for chills and fever  HENT: Positive for congestion, ear pain, rhinorrhea and sore throat  Negative for postnasal drip, sinus pain, sinus pressure and sneezing  Respiratory: Positive for cough  Negative for chest tightness, shortness of breath, wheezing and stridor  Cardiovascular: Negative for chest pain and palpitations           Current Medications       Current Outpatient Prescriptions:     ACCU-CHEK FASTCLIX LANCETS MISC, Test 4 times per day , Disp: 102 each, Rfl: 3    ACCU-CHEK GUIDE test strip, Test 4 times per day , Disp: 100 each, Rfl: 3    Blood Glucose Monitoring Suppl (ACCU-CHEK GUIDE) w/Device KIT, by Does not apply route 4 (four) times a day Test 4 times daily, Disp: 1 kit, Rfl: 0    Prenatal MV-Min-Fe Fum-FA-DHA (PRENATAL+DHA PO), Take by mouth, Disp: , Rfl:     Current Allergies     Allergies as of 01/23/2019 - Reviewed 01/23/2019   Allergen Reaction Noted    Sulfa antibiotics Rash 01/26/2018    Latex  09/27/2016    Sulfamethoxazole-trimethoprim  09/27/2016              Past Medical History:   Diagnosis Date    Gestational diabetes     History of atrial fibrillation     Pt reports was stress related and resolved       Past Surgical History:   Procedure Laterality Date    MOUTH SURGERY      OTHER SURGICAL HISTORY      jaw surgery       Family History   Problem Relation Age of Onset    Cancer Maternal Grandmother     Cancer Maternal Grandfather     Diabetes Maternal Grandfather          Medications have been verified  The following portions of the patient's history were reviewed and updated as appropriate: allergies, current medications, past family history, past medical history, past social history, past surgical history and problem list     Objective   /86   Pulse (!) 127   Temp 99 3 °F (37 4 °C) (Tympanic)   Resp 16   Ht 5' 5" (1 651 m)   Wt 131 kg (289 lb)   LMP 06/15/2018   SpO2 97%   BMI 48 09 kg/m²      Physical Exam     Physical Exam   Constitutional: She appears well-developed and well-nourished  No distress  HENT:   TMs are clear bilaterally without erythema or bulging  Nasal mucosa without erythema or edema  Oropharynx is without erythema, edema or exudates  Tonsils are 1+ hypertrophic  Cardiovascular: Normal rate, regular rhythm and normal heart sounds  Pulmonary/Chest: Effort normal and breath sounds normal  No respiratory distress  She has no wheezes  She has no rales  Lymphadenopathy:     She has cervical adenopathy (Bilateral mild tonsillar)

## 2019-01-23 NOTE — PROGRESS NOTES
The patient was seen today for an ultrasound  Please see ultrasound report (located under Ob Procedures) for additional details  Thank you very much for allowing us to participate in the care of this very nice patient  Should you have any questions, please do not hesitate to contact me  MD Facundo Álvarezucah  Attending Physician, [place]

## 2019-01-23 NOTE — PATIENT INSTRUCTIONS
Kick Counts in Pregnancy   AMBULATORY CARE:   Kick counts  measure how much your baby is moving in your womb  A kick from your baby can be felt as a twist, turn, swish, roll, or jab  It is common to feel your baby kicking at 26 to 28 weeks of pregnancy  You may feel your baby kick as early as 20 weeks of pregnancy  Seek care immediately if:   · You feel your baby kick less as the day goes on      · You do not feel any kicks in a day  Contact your healthcare provider if:   · You feel a change in the number of kicks or movements of your baby  · You feel fewer than 10 kicks within 2 hours after counting  · You have questions or concerns about your baby's movements  Why measure kick counts:  Your baby's movement may provide information about your baby's health  He may move less, or not at all, if there are problems  He may move less if he does not have enough room to grow in your uterus (womb)  He may also move less if he is not getting enough oxygen or nutrition from the placenta  Tell your healthcare provider as soon as you feel a change in your baby's movements  Problems that are found earlier are easier to treat  When to measure kick counts:   · Measure kick counts at the same time every day  · Measure kick counts when your baby is awake and most active  Your baby may be most active in the evening  · Measure kick counts after a meal or snack or when your baby is usually most active  Your baby may be more active after you eat or in the evening  · You should not smoke while you are pregnant  Smoking increases the risk of health problems for you and for your baby during your pregnancy  If you do smoke, wait 2 hours to measure kick counts  Nicotine can make your baby more active than usual   How to measure kick counts:  Check that your baby is awake before you measure kick counts  You can wake up your baby by lightly pushing on your belly, walking, or drinking something cold   Your healthcare provider may tell you different ways to measure kick counts  He/She may tell you to do the following:  · Use a chart or clock to keep track of the time you start and finish counting  · Sit in a chair or lie on your left side  · Place your hands on the largest part of your belly  · Count until you reach 10 kicks  Write down how much time it takes to count 10 kicks  · It may take 30 minutes to 2 hours to count 10 kicks  It should not take more than 2 hours to count 10 kicks  If you do not feel 10 kicks within 2 hours, Call your Obstetrician    Follow up with your healthcare provider as directed:  Write down your questions so you remember to ask them during your visits  © 2017 [employer] Information is for End User's use only and may not be sold, redistributed or otherwise used for commercial purposes  All illustrations and images included in CareNotes® are the copyrighted property of A D A M [employer] Inc  or [employer]  The above information is an  only  It is not intended as medical advice for individual conditions or treatments  Talk to your doctor, nurse or pharmacist before following any medical regimen to see if it is safe and effective for you

## 2019-01-24 ENCOUNTER — OFFICE VISIT (OUTPATIENT)
Dept: PERINATAL CARE | Facility: CLINIC | Age: 34
End: 2019-01-24
Payer: COMMERCIAL

## 2019-01-24 VITALS
HEIGHT: 65 IN | DIASTOLIC BLOOD PRESSURE: 77 MMHG | BODY MASS INDEX: 47.98 KG/M2 | WEIGHT: 288 LBS | HEART RATE: 90 BPM | SYSTOLIC BLOOD PRESSURE: 109 MMHG | RESPIRATION RATE: 18 BRPM

## 2019-01-24 DIAGNOSIS — O24.414 INSULIN CONTROLLED GESTATIONAL DIABETES MELLITUS (GDM) IN THIRD TRIMESTER: Primary | ICD-10-CM

## 2019-01-24 DIAGNOSIS — O99.210 OBESITY AFFECTING PREGNANCY, ANTEPARTUM: ICD-10-CM

## 2019-01-24 DIAGNOSIS — Z71.89 INJECTION EDUCATION, ENCOUNTER FOR: ICD-10-CM

## 2019-01-24 DIAGNOSIS — Z3A.31 31 WEEKS GESTATION OF PREGNANCY: ICD-10-CM

## 2019-01-24 PROCEDURE — 99215 OFFICE O/P EST HI 40 MIN: CPT | Performed by: NURSE PRACTITIONER

## 2019-01-24 RX ORDER — INSULIN GLARGINE 100 [IU]/ML
INJECTION, SOLUTION SUBCUTANEOUS
Qty: 15 ML | Refills: 0 | Status: SHIPPED | OUTPATIENT
Start: 2019-01-24 | End: 2019-02-19 | Stop reason: SDUPTHER

## 2019-01-24 NOTE — PATIENT INSTRUCTIONS
1  Due to FBS>90, start basaglar 35 units at 9 pm daily  Check a 3 am glucose and please have bedtime snack with carb plus protein  2  Continue self monitoring blood glucose fasting and 2 hours after start of each meal  Keep glucose log  3  Continue GDM diet 3 meals and 3 snacks  No more than 9 to 10 hours fasting overnight  4  Stay active if no restriction from your OB, up to 30 minutes a day  5  Continue prenatal vitamin  6  Fetal growth ultrasound every 4 weeks  7  Starting at 32 weeks, start NST twice a week and AN weekly  8  Check A1c and CMP  9  Report on Monday, 1/28/19 or sooner if needed  10  Always have glucose for hypoglycemia, use 15 by 15 rule  11  Follow-up in office as needed

## 2019-01-25 LAB — B-HEM STREP SPEC QL CULT: NEGATIVE

## 2019-01-25 NOTE — PROGRESS NOTES
Insulin controlled gestational diabetes mellitus in third trimester  31 weeks gestation of pregnancy  Obesity affecting pregnancy  Encounter for injection education    1  Due to FBS>90, start basaglar 35 units at 9 pm daily  Check a 3 am glucose and please have bedtime snack with carb plus protein  2  Continue self monitoring blood glucose fasting and 2 hours after start of each meal  Keep glucose log  3  Continue GDM diet 3 meals and 3 snacks  No more than 9 to 10 hours fasting overnight  4  Stay active if no restriction from your OB, up to 30 minutes a day  5  Continue prenatal vitamin  6  Fetal growth ultrasound every 4 weeks  7  Starting at 32 weeks, start NST twice a week and AN weekly  8  Check A1c and CMP  9  Report on Monday, 19 or sooner if needed  10  Always have glucose for hypoglycemia, use 15 by 15 rule  11  Follow-up in office as needed  DATE: 19   RE: Germania Elias   : 1985  MACHO: Estimated Date of Delivery: 3/24/19  EGA:  31w5d    Here for follow-up GDM and insulin pen education  Following recommended diet 3 meals and 3 snacks  Self monitoring blood glucose 4 times a day  During visit Guerline Lea became very upset, stating everyone refers to her weight and that she will become a diabetic  Reassured her that after delivery 7% weight loss with lifestyle modifications can decrease risk of developing T2DM  For now we need to focus on tighter glucose control during pregnancy and insulin will improve glucose readings  Metformin had been discussed and she refuses secondary to crossing the placenta  Height: 5' 5" (1 651 m)   Weight: 131 kg (288 lb)  Vitals:    19 1100   BP: 109/77   Pulse: 90   Resp: 18         The patient was instructed on the following:     Insulin Pen use  Initiate basaglar 35 units at 9 pm daily   Insulin administration times, insulin action   Hypoglycemia signs, symptoms and treatment     Increase in maternal-fetal surveillance with insulin initiation   Side effects of hyperglycemia in pregnancy including macrosomia,  hypoglycemia, polyhydramnios, pre-term labor and stillbirth   Continue to monitor blood glucoses via fingerstick fasting (goal 60 mg/dl to 90 mg/dl) and two hours post prandial (goal less than 120 mg/dl)   Follow up with our office via phone or email on Monday, 19 for evaluation of blood glucose levels  Thank you for the opportunity to participate in the care of this patient  I can be reached at [phone] should you have any questions  Time spent with patient 45 minutes; time spent face to face counseling greater than 50% of the appointment

## 2019-01-26 ENCOUNTER — OFFICE VISIT (OUTPATIENT)
Dept: URGENT CARE | Age: 34
End: 2019-01-26
Payer: COMMERCIAL

## 2019-01-26 VITALS
DIASTOLIC BLOOD PRESSURE: 69 MMHG | OXYGEN SATURATION: 98 % | TEMPERATURE: 98.2 F | RESPIRATION RATE: 18 BRPM | HEIGHT: 65 IN | WEIGHT: 289 LBS | SYSTOLIC BLOOD PRESSURE: 110 MMHG | HEART RATE: 112 BPM | BODY MASS INDEX: 48.15 KG/M2

## 2019-01-26 DIAGNOSIS — N39.0 URINARY TRACT INFECTION WITHOUT HEMATURIA, SITE UNSPECIFIED: Primary | ICD-10-CM

## 2019-01-26 DIAGNOSIS — B37.9 YEAST INFECTION: ICD-10-CM

## 2019-01-26 LAB
ALBUMIN SERPL-MCNC: 3.7 G/DL (ref 3.5–5.5)
ALBUMIN/GLOB SERPL: 1.2 {RATIO} (ref 1.2–2.2)
ALP SERPL-CCNC: 129 IU/L (ref 39–117)
ALT SERPL-CCNC: 152 IU/L (ref 0–32)
AST SERPL-CCNC: 65 IU/L (ref 0–40)
BILIRUB SERPL-MCNC: <0.2 MG/DL (ref 0–1.2)
BUN SERPL-MCNC: 10 MG/DL (ref 6–20)
BUN/CREAT SERPL: 17 (ref 9–23)
CALCIUM SERPL-MCNC: 9.2 MG/DL (ref 8.7–10.2)
CHLORIDE SERPL-SCNC: 98 MMOL/L (ref 96–106)
CO2 SERPL-SCNC: 20 MMOL/L (ref 20–29)
CREAT SERPL-MCNC: 0.58 MG/DL (ref 0.57–1)
EST. AVERAGE GLUCOSE BLD GHB EST-MCNC: 134 MG/DL
GLOBULIN SER-MCNC: 3.1 G/DL (ref 1.5–4.5)
GLUCOSE SERPL-MCNC: 84 MG/DL (ref 65–99)
HBA1C MFR BLD: 6.3 % (ref 4.8–5.6)
POTASSIUM SERPL-SCNC: 4.4 MMOL/L (ref 3.5–5.2)
PROT SERPL-MCNC: 6.8 G/DL (ref 6–8.5)
SL AMB  POCT GLUCOSE, UA: NEGATIVE
SL AMB EGFR AFRICAN AMERICAN: 142 ML/MIN/1.73
SL AMB EGFR NON AFRICAN AMERICAN: 123 ML/MIN/1.73
SL AMB LEUKOCYTE ESTERASE,UA: ABNORMAL
SL AMB POCT BILIRUBIN,UA: NEGATIVE
SL AMB POCT BLOOD,UA: ABNORMAL
SL AMB POCT CLARITY,UA: ABNORMAL
SL AMB POCT COLOR,UA: YELLOW
SL AMB POCT KETONES,UA: NEGATIVE
SL AMB POCT NITRITE,UA: NEGATIVE
SL AMB POCT PH,UA: 5
SL AMB POCT SPECIFIC GRAVITY,UA: 1.01
SL AMB POCT URINE PROTEIN: NEGATIVE
SL AMB POCT UROBILINOGEN: ABNORMAL
SODIUM SERPL-SCNC: 134 MMOL/L (ref 134–144)

## 2019-01-26 PROCEDURE — 81002 URINALYSIS NONAUTO W/O SCOPE: CPT | Performed by: PHYSICIAN ASSISTANT

## 2019-01-26 PROCEDURE — 99283 EMERGENCY DEPT VISIT LOW MDM: CPT | Performed by: PHYSICIAN ASSISTANT

## 2019-01-26 PROCEDURE — 87086 URINE CULTURE/COLONY COUNT: CPT | Performed by: PHYSICIAN ASSISTANT

## 2019-01-26 PROCEDURE — G0382 LEV 3 HOSP TYPE B ED VISIT: HCPCS | Performed by: PHYSICIAN ASSISTANT

## 2019-01-26 RX ORDER — CEPHALEXIN 500 MG/1
500 CAPSULE ORAL EVERY 8 HOURS SCHEDULED
Qty: 21 CAPSULE | Refills: 0 | Status: SHIPPED | OUTPATIENT
Start: 2019-01-26 | End: 2019-02-02

## 2019-01-26 NOTE — PROGRESS NOTES
[facility]        NAME: Joey Rivera is a 34 y o  female  : 1985    MRN: [id number]  DATE: 2019  TIME: 1:45 PM    Assessment and Plan   Urinary tract infection without hematuria, site unspecified [N39 0]  1  Urinary tract infection without hematuria, site unspecified  POCT urine dip    Urine culture    cephalexin (KEFLEX) 500 mg capsule   2  Yeast infection       Patient already using over-the-counter Monistat for yeast infection    Patient Instructions       Take medications as directed  Drink plenty of fluids  Follow up with family doctor this week  Go to ER immediately if new or worsening symptoms occur  Chief Complaint     Chief Complaint   Patient presents with    Possible UTI     "I think I either have a UTI or a yeast infection"         History of Present Illness       Difficulty Urinating    This is a new problem  Episode onset: Four days ago  The problem occurs every urination  The problem has been gradually worsening  The quality of the pain is described as burning  The pain is moderate  There is no history of pyelonephritis  Associated symptoms include a discharge, frequency and urgency  Pertinent negatives include no chills, flank pain, nausea or vomiting  Associated symptoms comments: Itchiness of labia  Treatments tried: Starting Monistat for yeast infection  History of recurrent yeast infections  The treatment provided mild relief  Patient is currently pregnant  Review of Systems   Review of Systems   Constitutional: Negative  Negative for chills, fatigue and fever  HENT: Negative  Eyes: Negative  Respiratory: Negative  Cardiovascular: Negative  Gastrointestinal: Negative for abdominal pain, constipation, diarrhea, nausea and vomiting  Endocrine: Negative  Genitourinary: Positive for dysuria, frequency and urgency  Negative for flank pain, pelvic pain, vaginal bleeding, vaginal discharge and vaginal pain     Musculoskeletal: Negative for back pain, gait problem, neck pain and neck stiffness  Skin: Negative  Negative for pallor and rash  Allergic/Immunologic: Negative  Neurological: Negative  Negative for weakness and numbness  Hematological: Negative  Psychiatric/Behavioral: Negative  Current Medications       Current Outpatient Prescriptions:     BASAGLAR KWIKPEN 100 units/mL injection pen, Inject SC 35 units at 9 pm daily  To be titrated  , Disp: 15 mL, Rfl: 0    Insulin Pen Needle 29G X 5MM MISC, Use one a day and as directed , Disp: 100 each, Rfl: 1    Prenatal MV-Min-Fe Fum-FA-DHA (PRENATAL+DHA PO), Take by mouth, Disp: , Rfl:     ACCU-CHEK FASTCLIX LANCETS MISC, Test 4 times per day , Disp: 102 each, Rfl: 3    ACCU-CHEK GUIDE test strip, Test 4 times per day , Disp: 100 each, Rfl: 3    Blood Glucose Monitoring Suppl (ACCU-CHEK GUIDE) w/Device KIT, by Does not apply route 4 (four) times a day Test 4 times daily, Disp: 1 kit, Rfl: 0    cephalexin (KEFLEX) 500 mg capsule, Take 1 capsule (500 mg total) by mouth every 8 (eight) hours for 7 days, Disp: 21 capsule, Rfl: 0    Current Allergies     Allergies as of 01/26/2019 - Reviewed 01/26/2019   Allergen Reaction Noted    Sulfa antibiotics Rash 01/26/2018    Latex  09/27/2016    Sulfamethoxazole-trimethoprim  09/27/2016            The following portions of the patient's history were reviewed and updated as appropriate: allergies, current medications, past family history, past medical history, past social history, past surgical history and problem list      Past Medical History:   Diagnosis Date    Gestational diabetes     History of atrial fibrillation     Pt reports was stress related and resolved       Past Surgical History:   Procedure Laterality Date    MOUTH SURGERY      OTHER SURGICAL HISTORY      jaw surgery       Family History   Problem Relation Age of Onset    Cancer Maternal Grandmother     Cancer Maternal Grandfather     Diabetes Maternal Grandfather          Medications have been verified  Objective   /69   Pulse (!) 112   Temp 98 2 °F (36 8 °C) (Tympanic)   Resp 18   Ht 5' 5" (1 651 m)   Wt 131 kg (289 lb)   LMP 06/15/2018   SpO2 98%   BMI 48 09 kg/m²        Physical Exam     Physical Exam   Constitutional: She appears well-developed and well-nourished  No distress  HENT:   Head: Normocephalic and atraumatic  Cardiovascular: Normal rate, regular rhythm, normal heart sounds and intact distal pulses  Pulmonary/Chest: Effort normal and breath sounds normal  No respiratory distress  She has no wheezes  She has no rales  Abdominal: Soft  Bowel sounds are normal  She exhibits no distension  There is no tenderness  There is no guarding  No CVA tender   Skin: Skin is warm  No rash noted  She is not diaphoretic  No pallor  Nursing note and vitals reviewed

## 2019-01-26 NOTE — PATIENT INSTRUCTIONS
Take medications as directed  Drink plenty of fluids  Follow up with family doctor this week  Go to ER immediately if new or worsening symptoms occur  Urinary Tract Infection in Pregnancy   WHAT YOU NEED TO KNOW:   A urinary tract infection (UTI) is caused by bacteria that get inside your urinary tract  The urinary tract includes your kidneys and bladder  UTIs are common in women, especially during pregnancy  This is because of changes in your immune system, hormones, and uterus  As your uterus grows, your bladder may not completely empty  Bacteria can grow in the urine left in your bladder and cause a UTI  UTIs during pregnancy can increase your risk for a kidney infection and  labor  DISCHARGE INSTRUCTIONS:   Return to the emergency department if:   · You are urinating very little or not at all  · You have severe pain  · You have a fever and chills  Contact your healthcare provider if:   · You have pain in the sides of your back  · You do not feel better after 2 days of treatment  · You are vomiting  · You have questions or concerns about your condition or care  Medicines:   · Antibiotics  help fight a bacterial infection  · Medicines  may be given to decrease pain and burning when you urinate  They will also help decrease the feeling that you need to urinate often  These medicines will make your urine orange or red  · Take your medicine as directed  Contact your healthcare provider if you think your medicine is not helping or if you have side effects  Tell him or her if you are allergic to any medicine  Keep a list of the medicines, vitamins, and herbs you take  Include the amounts, and when and why you take them  Bring the list or the pill bottles to follow-up visits  Carry your medicine list with you in case of an emergency  Prevent another UTI:   · Urinate when you feel the urge  Do not hold your urine  Urinate as soon as needed  Always urinate after you have sex  This helps flush out bacteria passed during sex  · Drink liquids as directed  Ask how much liquid to drink each day and which liquids are best for you  You may need to drink more fluids than usual to help flush bacteria out of your urinary tract  Do not drink caffeine or carbonated liquids  These drinks can irritate your bladder  Your healthcare provider may recommend cranberry juice to help prevent a UTI  · Wipe from front to back after you urinate or have a bowel movement  This will help prevent germs from getting into your urinary tract through your urethra  · Do pelvic muscle exercises often  Pelvic muscle exercises may help you start and stop urinating  Strong pelvic muscles may help you empty your bladder easier  Squeeze these muscles tightly for 5 seconds like you are trying to hold back urine  Then relax for 5 seconds  Gradually work up to squeezing for 10 seconds  Do 3 sets of 15 repetitions a day, or as directed  Follow up with your healthcare provider as directed: You may need to return for more urine tests  Write down your questions so you remember to ask them during your visits  © 2017 [employer] Information is for End User's use only and may not be sold, redistributed or otherwise used for commercial purposes  All illustrations and images included in CareNotes® are the copyrighted property of A D A M [employer] Inc  or [employer]  The above information is an  only  It is not intended as medical advice for individual conditions or treatments  Talk to your doctor, nurse or pharmacist before following any medical regimen to see if it is safe and effective for you

## 2019-01-28 ENCOUNTER — TELEPHONE (OUTPATIENT)
Dept: PERINATAL CARE | Facility: CLINIC | Age: 34
End: 2019-01-28

## 2019-01-29 ENCOUNTER — OFFICE VISIT (OUTPATIENT)
Dept: URGENT CARE | Facility: CLINIC | Age: 34
End: 2019-01-29
Payer: COMMERCIAL

## 2019-01-29 ENCOUNTER — OB ABSTRACT (OUTPATIENT)
Dept: PERINATAL CARE | Facility: CLINIC | Age: 34
End: 2019-01-29

## 2019-01-29 VITALS
BODY MASS INDEX: 47.98 KG/M2 | HEART RATE: 100 BPM | RESPIRATION RATE: 20 BRPM | SYSTOLIC BLOOD PRESSURE: 132 MMHG | DIASTOLIC BLOOD PRESSURE: 92 MMHG | HEIGHT: 65 IN | TEMPERATURE: 96.9 F | WEIGHT: 288 LBS

## 2019-01-29 DIAGNOSIS — J01.00 ACUTE MAXILLARY SINUSITIS, RECURRENCE NOT SPECIFIED: Primary | ICD-10-CM

## 2019-01-29 LAB — BACTERIA UR CULT: NORMAL

## 2019-01-29 PROCEDURE — G0382 LEV 3 HOSP TYPE B ED VISIT: HCPCS | Performed by: FAMILY MEDICINE

## 2019-01-29 PROCEDURE — 99283 EMERGENCY DEPT VISIT LOW MDM: CPT | Performed by: FAMILY MEDICINE

## 2019-01-29 NOTE — PROGRESS NOTES
[facility]        NAME: Eduardo Mae is a 34 y o  female  : 1985    MRN: [id number]  DATE: 2019  TIME: 10:03 AM    Assessment and Plan   Acute maxillary sinusitis, recurrence not specified [J01 00]  1  Acute maxillary sinusitis, recurrence not specified           Patient Instructions   Patient Instructions   Patient is already on Keflex for UTI symptoms without acute UTI, this is a suitable antibiotic for acute sinusitis  Rapid strep is negative, will send for culture if positive again Keflex as appropriate treatment for this  Would remain on Keflex given current pregnancy status  Follow-up with PCP in 3-5 days, follow up with OBGYN as scheduled        Proceed to  ER if symptoms worsen  Chief Complaint     Chief Complaint   Patient presents with    Cold Like Symptoms     Pt was here one week ago for same complaint; Sore throat, cough (worsens at night) bilateral ear pain, and gooey eyes  Patient is 7 months pregnant  History of Present Illness       Patient presents with complaint of sinus congestion and pressure which she feels is becoming worse, she also has a sore throat, no cough no chest tightness shortness of breath or wheezing  She is 33 weeks pregnant  She was seen at the [facility] now 3 days ago diagnosed with a UTI and started on Keflex  Review of culture shows urine culture only reveals mixed contaminants  Patient denies fevers or chills  Review of Systems   Review of Systems   Constitutional: Negative  HENT: Positive for congestion, sinus pressure and sore throat  Respiratory: Negative  Negative for chest tightness, shortness of breath and wheezing  Cardiovascular: Negative  Gastrointestinal: Negative            Current Medications       Current Outpatient Prescriptions:     ACCU-CHEK FASTCLIX LANCETS MISC, Test 4 times per day , Disp: 102 each, Rfl: 3    ACCU-CHEK GUIDE test strip, Test 4 times per day , Disp: 100 each, Rfl: 3    BASAGLAR KWIKPEN 100 units/mL injection pen, Inject SC 35 units at 9 pm daily  To be titrated  , Disp: 15 mL, Rfl: 0    Blood Glucose Monitoring Suppl (ACCU-CHEK GUIDE) w/Device KIT, by Does not apply route 4 (four) times a day Test 4 times daily, Disp: 1 kit, Rfl: 0    cephalexin (KEFLEX) 500 mg capsule, Take 1 capsule (500 mg total) by mouth every 8 (eight) hours for 7 days, Disp: 21 capsule, Rfl: 0    Insulin Pen Needle 29G X 5MM MISC, Use one a day and as directed , Disp: 100 each, Rfl: 1    Prenatal MV-Min-Fe Fum-FA-DHA (PRENATAL+DHA PO), Take by mouth, Disp: , Rfl:     Current Allergies     Allergies as of 01/29/2019 - Reviewed 01/29/2019   Allergen Reaction Noted    Sulfa antibiotics Rash 01/26/2018    Latex  09/27/2016    Sulfamethoxazole-trimethoprim  09/27/2016            The following portions of the patient's history were reviewed and updated as appropriate: allergies, current medications, past family history, past medical history, past social history, past surgical history and problem list      Past Medical History:   Diagnosis Date    Gestational diabetes     History of atrial fibrillation     Pt reports was stress related and resolved       Past Surgical History:   Procedure Laterality Date    MOUTH SURGERY      OTHER SURGICAL HISTORY      jaw surgery       Family History   Problem Relation Age of Onset    Cancer Maternal Grandmother     Cancer Maternal Grandfather     Diabetes Maternal Grandfather          Medications have been verified  Objective   /92   Pulse 100   Temp (!) 96 9 °F (36 1 °C)   Resp 20   Ht 5' 5" (1 651 m)   Wt 131 kg (288 lb)   LMP 06/15/2018   BMI 47 93 kg/m²        Physical Exam     Physical Exam   Constitutional: She appears well-developed and well-nourished  No distress  HENT:   Right Ear: External ear normal    Left Ear: External ear normal    Mouth/Throat: No oropharyngeal exudate     Mild to moderate intranasal mucosal erythema edema and rhinorrhea, PND which is mild, TM clear bilaterally, pharynx clear without exudates   Eyes: Conjunctivae are normal    Neck: Neck supple  Cardiovascular: Normal rate, regular rhythm and normal heart sounds  Pulmonary/Chest: Effort normal and breath sounds normal  No respiratory distress  She has no wheezes  She has no rales  Lymphadenopathy:     She has no cervical adenopathy

## 2019-01-29 NOTE — PATIENT INSTRUCTIONS
Patient is already on Keflex for UTI symptoms without acute UTI, this is a suitable antibiotic for acute sinusitis  Rapid strep is negative, will send for culture if positive again Keflex as appropriate treatment for this  Would remain on Keflex given current pregnancy status    Follow-up with PCP in 3-5 days, follow up with OBGYN as scheduled

## 2019-01-29 NOTE — PROGRESS NOTES
Date:  19  RE: Bill Zaragoza    : 1985  Estimated Date of Delivery: 3/24/19  EGA: 32w2d  OB/GYN: Makenzie Roberts at [facility]        Blood glucose report from patient's e-mail  Current regimen:  Basaglar- 35 units at bedtime started on    2200 calorie gestational diabetes meal plan; 3 meals and 3 snacks including protein  Self-monitoring blood glucose 4 times per day with an Accu-chek blood glucose monitor  Patient was advised to check 3 am blood glucose with start of insulin  Patient is requesting to discontinue 3 am blood glucose check due to illness  Plan:  Patient reported recent illness  Noted in EMR patient has a UTI and yeast infection as well as acute sinusitis, recently started Keflex  Continue Basaglar- 35 units at bedtime  Continue diet and advised patient she can return to original bedtime snack of 2-3 ounces of protein and no carbohydrate  Encouraged walking 20-30 minutes after dinner daily if there are no activity restrictions from OB  Continue SMBG 4 times per day  Instructed patient to discontinue 3 am blood glucose check  Date due to report next:  Thursday, 19      Lamar Reddy RD,LDN,CDE  Diabetes Educator   Diabetes and Pregnancy Program

## 2019-02-01 ENCOUNTER — OB ABSTRACT (OUTPATIENT)
Dept: PERINATAL CARE | Facility: CLINIC | Age: 34
End: 2019-02-01

## 2019-02-01 NOTE — PROGRESS NOTES
Date:  19  RE: Mack Camarena    : 1985  Estimated Date of Delivery: 3/24/19  EGA: 32w5d  OB/GYN: Jus Burris at [facility]      Blood glucose report from patient's e-mail  Current regimen:  Basaglar- 35 units at bedtime started on    2200 calorie gestational diabetes meal plan; 3 meals and 3 snacks including protein  Self-monitoring blood glucose 4 times per day with an Accu-chek blood glucose monitor  Plan:  Patient reported recent illness  Noted in EMR patient has a UTI and yeast infection as well as acute sinusitis, recently started Keflex  Patient reported she is starting to feel better  Increase Basaglar- from 35 to 40 units at bedtime  Continue diet and advised patient she can return to original bedtime snack of 2-3 ounces of protein and no carbohydrate  Encouraged walking 20-30 minutes after dinner daily if there are no activity restrictions from OB  Continue SMBG 4 times per day  Date due to report next:  Monday, 19      Shelton Navarro RD,LDN,CDE  Diabetes Educator   Diabetes [facility] Pregnancy Program

## 2019-02-05 ENCOUNTER — OB ABSTRACT (OUTPATIENT)
Dept: PERINATAL CARE | Facility: CLINIC | Age: 34
End: 2019-02-05

## 2019-02-05 NOTE — PROGRESS NOTES
Date:  19  RE: Donta Webster    : 1985  Estimated Date of Delivery: 3/24/19  EGA: 33w2d  OB/GYN: Christi Randolph at [facility]        Blood glucose report from patient's e-mail  Current regimen:  Basaglar- 40 units at bedtime   2200 calorie gestational diabetes meal plan; 3 meals/snacks including protein  Self-monitoring blood glucose 4 times per day with an Accu-chek blood glucose monitor      Plan:  Basaglar- increase to 48 units at bedtime  Continue diet and SMBG      Date due to report next:  19    Hitesh Alvarado, RN  Diabetes Educator   Diabetes and Pregnancy Program Reason For Visit  Domenico Crouhc is here today for a nurse visit for TB reading. Current Meds   1. AmLODIPine Besylate 10 MG Oral Tablet; TAKE 1 TABLET DAILY (NEED APPOINTMENT   FOR MORE REFILLS); Therapy: 65GRL4343 to ([phone])  Requested for: 30Smd3044; Last   Rx:[id number] Ordered   2. Azelastine HCl - 0.1 % Nasal Solution; USE 2 SPRAYS IN EACH NOSTRIL ONCE DAILY; Therapy: 71KDA9990 to (Candance Hoard)  Requested for: 11GZS4343; Last   Rx:[id number]; Status: ACTIVE - Transmit to Pharmacy - Awaiting Verification Ordered   3. DULoxetine HCl - 60 MG Oral Capsule Delayed Release Particles; TAKE 1 CAPSULE BY   MOUTH DAILY; Therapy: 53FET5622 to (Evaluate:03Jun2018)  Requested for: 71DRH3989; Last   [id number]; Status: ACTIVE - Transmit to Pharmacy - Awaiting Verification Ordered   4. Humira Pen 40 MG/0.8ML Subcutaneous Pen-injector Kit; INJECT 1 PEN (40 MG)   SUBCUTANEOUSLY EVERY OTHER WEEK. KEEP REFRIGERATED; Therapy: 20Fsy3226 to (Mis Nelson)  Requested for: 41TNL8511; Last   [id number]; Status: ACTIVE - Transmit to Pharmacy - Awaiting Verification   Ordered   5. HydroCHLOROthiazide 25 MG Oral Tablet; TAKE 1 TABLET BY MOUTH DAILY AS   DIRECTED; Therapy: 07CUU3342 to (Evaluate:45Kue2048)  Requested for: 65Pmn8680; Last   Rx:[id number] Ordered   6. Leflunomide 20 MG Oral Tablet (Arava); TAKE 1 TABLET DAILY; Therapy: 84EWE3543 to (Neville Castellanos)  Requested for: 40CPN9028; Last   [id number]; Status: ACTIVE - Transmit to Pharmacy - Awaiting Verification Ordered   7. Meloxicam 15 MG Oral Tablet; TAKE 1 TABLET BY MOUTH DAILY AS NEEDED; Therapy: 98BAP9393 to (Evaluate:84Frv6233)  Requested for: 09BWU0472; Last   GX:[id number]; Status: ACTIVE - Transmit to Pharmacy - Awaiting Verification Ordered   8. Mirena 20 MCG/24HR Intrauterine Intrauterine Device; Therapy: ([phone]) to Recorded   9.  Montelukast Sodium 10 MG Oral Tablet (Singulair); TAKE 1 TABLET BY MOUTH EVERY   DAY; Therapy: 22JYP9190 to (Evaluate:25Hhn4009)  Requested for: 63VGU3284; Last   Rx:[id number] Ordered   10. Potassium Chloride Sabrina ER 20 MEQ Oral Tablet Extended Release; one PO daily; Therapy: 32ZJG0701 to (Elias Person)  Requested for: 46KTB2253; Last    Rx:[id number] Ordered    Allergies  Sulfa Antibiotics  Sulfa Drugs    Nurse Documentation    Pt presents A&Ox4 ambulatory with steady gait for TB reading. Pt denies any other complaints at this time. Pt denies any fevers, chills, night sweats, cough. NAD noted. Equal lung expansion noted. TB reading NEGATIVE- no induration, wheel, redness, or inflammation noted to TB test site. Work biometric form completed and provided to patient. Copy retained for EHR. Pt ambulatory to waiting area with steady gait and all belongings. Pt to f/u as needed. , Patient discharged to home. Plan    Patient Instructions TB reading NEGATIVE- no induration, wheel, redness, or inflammation noted to TB test site. Work biometric form completed and provided to patient. Copy retained for EHR. Pt ambulatory to waiting area with steady gait and all belongings. Pt to f/u as needed. Return to Clinic as needed. Signatures   Electronically signed by :  Jayde Salcido R.N.; Jan 19 2018 11:51AM CST

## 2019-02-13 ENCOUNTER — DOCUMENTATION (OUTPATIENT)
Dept: PERINATAL CARE | Facility: CLINIC | Age: 34
End: 2019-02-13

## 2019-02-13 NOTE — PROGRESS NOTES
Date:  19  RE: Bill Zaragoza    : 1985  Estimated Date of Delivery: 3/24/19  EGA: 34w3d  OB/GYN: Johnny Woodall at [facility]              Blood glucose report from patient's e-mail  Current regimen:  Basaglar- 48 units at bedtime  2200 calorie gestational diabetes meal plan; 3 meals/snacks including protein  Self-monitoring blood glucose 4 times per day with an Accu-chek blood glucose monitor      Plan:  Due to FBS>90, basaglar increase from 48 to 58 units at 9 or 10 pm daily, be consistent with timing of insulin  Continue diet with 3 meals and 3 snacks including recommended combination of carb, protein and fat per meal/snack  No more than 9 to 10 hours of fasting overnight  Add 3 am glucose check  Continue SMBG and if no restriction from your OB, up to 30 minutes a day of walking  19 fetal growth ultrasound possible macrosomia, AC >95%, continue fetal growth ultrasounds every 4 weeks  NST twice a week and AN weekly  19 A1c 6 3% not at goal, repeat in 2 to 4 weeks  Date due to report next:  Monday, 19 or sooner if needed       TRICIA Diaz  Diabetes Educator   Diabetes and Pregnancy Program

## 2019-02-18 ENCOUNTER — ULTRASOUND (OUTPATIENT)
Dept: PERINATAL CARE | Facility: CLINIC | Age: 34
End: 2019-02-18
Payer: COMMERCIAL

## 2019-02-18 VITALS
SYSTOLIC BLOOD PRESSURE: 120 MMHG | BODY MASS INDEX: 48.15 KG/M2 | HEIGHT: 65 IN | WEIGHT: 289 LBS | RESPIRATION RATE: 18 BRPM | HEART RATE: 80 BPM | DIASTOLIC BLOOD PRESSURE: 76 MMHG

## 2019-02-18 DIAGNOSIS — O99.210 OBESITY AFFECTING PREGNANCY, ANTEPARTUM: ICD-10-CM

## 2019-02-18 DIAGNOSIS — Z3A.35 35 WEEKS GESTATION OF PREGNANCY: ICD-10-CM

## 2019-02-18 DIAGNOSIS — O24.414 INSULIN CONTROLLED GESTATIONAL DIABETES MELLITUS (GDM) IN THIRD TRIMESTER: ICD-10-CM

## 2019-02-18 PROCEDURE — 76816 OB US FOLLOW-UP PER FETUS: CPT | Performed by: OBSTETRICS & GYNECOLOGY

## 2019-02-18 PROCEDURE — 99213 OFFICE O/P EST LOW 20 MIN: CPT | Performed by: OBSTETRICS & GYNECOLOGY

## 2019-02-18 RX ORDER — CAPSAICIN 0.025 %
1 CREAM (GRAM) TOPICAL
Refills: 0 | COMMUNITY
Start: 2019-02-15 | End: 2021-04-20 | Stop reason: ALTCHOICE

## 2019-02-18 NOTE — PROGRESS NOTES
Austin Amaya was seen today for an ultrasound for fetal growth  She is an A2 GDM  She currently now is on 58 units of Basaglar at nighttime and reports that finally her fasting blood sugars are now in good control between the ranges of 75-85  She states her 2 hour post prandials are always less than 120  She is having her fetal testing through her OB office and had an NST earlier today  Ultrasound today shows that her fetus is in the 84th percentile and appears symmetric weighing 6 pounds 13 ounces  Her AN is 19 7 she states that she was over a 9 pound baby at birth and her mother weighed over 10 pounds at birth  Recommend she continue her twice weekly NST and weekly AN through her OB office  No further follow-up growth scans are recommended at this time  Recommend delivery between 39-40 weeks  The majority of time (greater then 50%) was spent on counseling and coordination of care of this patient and /or family member  Approximate face to face time was 15 minutes      Sylvester Morel MD

## 2019-02-19 ENCOUNTER — DOCUMENTATION (OUTPATIENT)
Dept: PERINATAL CARE | Facility: CLINIC | Age: 34
End: 2019-02-19

## 2019-02-19 DIAGNOSIS — Z71.89 INJECTION EDUCATION, ENCOUNTER FOR: ICD-10-CM

## 2019-02-19 DIAGNOSIS — O99.210 OBESITY AFFECTING PREGNANCY, ANTEPARTUM: ICD-10-CM

## 2019-02-19 DIAGNOSIS — Z3A.31 31 WEEKS GESTATION OF PREGNANCY: ICD-10-CM

## 2019-02-19 DIAGNOSIS — O24.414 INSULIN CONTROLLED GESTATIONAL DIABETES MELLITUS (GDM) IN THIRD TRIMESTER: ICD-10-CM

## 2019-02-19 RX ORDER — INSULIN GLARGINE 100 [IU]/ML
INJECTION, SOLUTION SUBCUTANEOUS
Qty: 30 ML | Refills: 0 | Status: SHIPPED | OUTPATIENT
Start: 2019-02-19 | End: 2021-04-20 | Stop reason: ALTCHOICE

## 2019-02-19 NOTE — PROGRESS NOTES
Date:  19  RE: Padmini Bruce    : 1985  Estimated Date of Delivery: 3/24/19  EGA: 35w2d  OB/GYN: Ayla Springer at [facility]      Blood glucose report from patient's e-mail  Current regimen:  Basaglar- 58 units at bedtime  2200 calorie gestational diabetes meal plan; 3 meals/snacks including protein  Self-monitoring blood glucose 4 times per day with an Accu-chek blood glucose monitor      Plan:  Continue current regimen okay to discontine 0300 BG test   19 fetal growth ultrasound showed fetus is in the 84th percentile  NST twice a week and AN weekly  19 A1c 6 3% not at goal, re-order week of 3-4-19      Date due to report next:  Monday, 19 or sooner if BG is above target ranges     Chet Hendrickson, RN  Diabetes Educator   Diabetes and Pregnancy Program

## 2019-11-12 ENCOUNTER — OFFICE VISIT (OUTPATIENT)
Dept: OBGYN CLINIC | Facility: OTHER | Age: 34
End: 2019-11-12
Payer: OTHER MISCELLANEOUS

## 2019-11-12 VITALS
BODY MASS INDEX: 47.15 KG/M2 | SYSTOLIC BLOOD PRESSURE: 115 MMHG | WEIGHT: 283 LBS | HEIGHT: 65 IN | HEART RATE: 82 BPM | DIASTOLIC BLOOD PRESSURE: 79 MMHG

## 2019-11-12 DIAGNOSIS — M19.011 ARTHROSIS OF RIGHT ACROMIOCLAVICULAR JOINT: ICD-10-CM

## 2019-11-12 DIAGNOSIS — M25.511 CHRONIC RIGHT SHOULDER PAIN: Primary | ICD-10-CM

## 2019-11-12 DIAGNOSIS — G89.29 CHRONIC RIGHT SHOULDER PAIN: Primary | ICD-10-CM

## 2019-11-12 DIAGNOSIS — M54.2 NECK PAIN: ICD-10-CM

## 2019-11-12 DIAGNOSIS — M75.41 IMPINGEMENT SYNDROME OF RIGHT SHOULDER: ICD-10-CM

## 2019-11-12 PROCEDURE — 99203 OFFICE O/P NEW LOW 30 MIN: CPT | Performed by: ORTHOPAEDIC SURGERY

## 2019-11-12 NOTE — PROGRESS NOTES
Assessment:       1  Chronic right shoulder pain    2  Neck pain    3  Sprain of right acromioclavicular ligament, initial encounter    4  Impingement syndrome of right shoulder          Plan:        Work-related injury ([place])  DOI: 01/04/2018    Clinical and prior radiologic findings discussed with patient today which are consistent with combination of right shoulder impingement as well as right acromioclavicular arthritic flare  Given that the patient has undergone physical therapy already without much relief, I recommended her undergoing an ultrasound-guided cortisone injection of her right acromioclavicular joint as well as her right subacromial bursa for further relief  Recommended her to continue physical therapy  I did  that the some of her right shoulder pain may be contributed by her chronic cervical degeneration seen on radiographs of her C4-5 and that the injection is both diagnostic/therapeutic in nature  Patient agreeable with plan and will follow up for ultrasound-guided appointment  Subjective:     Patient ID: Logan Gupta is a 30 y o  female  Chief Complaint: Neck pain    HPI  30year old left-hand dominant female here today in regards to initial evaluation of neck pain and right shoulder pain after work injury  Ongoing issue since a precipitating work injury on 01/04/2018 (patient reports working at [employer], was working on a machine in which steering got stuck and ran into a wall causing a jerking motion) in which she had a cervical XR that was unremarkable  Since then, she has had prior MRI and EMG done  She has attended physical therapy and chiropractor which initially was improving symptoms but has not fully improved symptoms    Today, she reports right sided neck pain that radiates to right shoulder and down to right elbow  Pain described as tingling  Patient feels weaker lifting bilaterally due to pain  Pain aggravated when sleeping on her lateral right shoulder  Additionally exacerbated with right shoulder activity/range of motion, and minimally with neck movement  His reports taking OTC Tylenol her pain without much relief  Based on record review patient had MRI on 04/25/2018 reporting no inflammatory issues, no acute findings, no rotator cuff tears  She additionally had an EMG on 06/08/2018 reporting very mild left carpal tunnel syndrome      She reports never receiving any cortisone injections of the shoulder in the past  She is not breastfeeding at this time  Social History     Occupational History    Not on file   Tobacco Use    Smoking status: Never Smoker    Smokeless tobacco: Never Used   Substance and Sexual Activity    Alcohol use: No    Drug use: No    Sexual activity: Yes     Partners: Male      Review of Systems   Constitutional: Negative for chills, fatigue, fever and unexpected weight change  HENT: Negative for hearing loss, nosebleeds and sore throat  Eyes: Negative for pain, redness and visual disturbance  Respiratory: Negative for cough, shortness of breath and wheezing  Cardiovascular: Negative for chest pain, palpitations and leg swelling  Gastrointestinal: Negative for abdominal pain, nausea and vomiting  Endocrine: Negative for polydipsia and polyuria  Genitourinary: Negative for difficulty urinating and hematuria  Musculoskeletal:        As per HPI   Skin: Negative for rash and wound  Neurological:        As per HPI   Psychiatric/Behavioral: Negative for decreased concentration and suicidal ideas  The patient is not nervous/anxious  Objective:     Ortho ExamPhysical Exam   Constitutional: She is oriented to person, place, and time  She appears well-developed and well-nourished  No distress  HENT:   Head: Normocephalic and atraumatic  Right Ear: External ear normal    Left Ear: External ear normal    Nose: Nose normal    Eyes: Conjunctivae are normal    Neck: Neck supple     Pulmonary/Chest: Effort normal and breath sounds normal  No respiratory distress  Abdominal: Soft  Neurological: She is alert and oriented to person, place, and time  Skin: Skin is warm and dry  Psychiatric: Her behavior is normal        Ortho Exam  Cervical  ROM: Neck extension and left lateral bending pre  Tenderness: +C4-5 tenderness  Sensation UE Bilateral:  C5: normal  C6: normal  C7: normal  C8: normal  T1: normal  Strength UE: 5/5 elbow, wrist, fingers bilatearl  Reflexes: symmetric bilateral triceps, biceps, corachobrachiais  Spurlings:     RIGHT SHOULDER:  Erythema: no  Swelling: no  Increased Warmth: no    Tenderness: +acromioclavicular joint and lateral shoulder over greatertuberosity    ROM  Abducts to 90 with discomfort over lateral shoulder actively, can be passively abducted to 120 degrees  Forwards flexion to 110 degrees      Strength  Abduction: 5/5  ER: 5/5  IR: 5/5    Drop-Arm: negative  Emptycan: negative  Belly Press: negative    Johnson:   Positive  Neer:     Positive  Cross-Arm: positive  Speeds: negative  [place]'s Test: negative          I have personally reviewed pertinent films in PACS and my interpretation is As per HPI  I interviewed, took the history and examined the patient  I discussed the case with the Resident and reviewed the Residents note , prescribed medications, and orders placed  I supervised the Resident and I agree with the Resident management plan as it was presented to me  I was present in the clinic and examined the patient      Eliseo Bonilla MD 11/14/19

## 2019-11-12 NOTE — PATIENT INSTRUCTIONS
Follow up for cortisone injection of your right subacromion and right AC (acromioclavicular) joint to relieve what I suspect is tendonosis of you rright rotator cuff and right AC joint degeneration

## 2019-11-22 ENCOUNTER — OFFICE VISIT (OUTPATIENT)
Dept: OBGYN CLINIC | Facility: OTHER | Age: 34
End: 2019-11-22
Payer: OTHER MISCELLANEOUS

## 2019-11-22 VITALS
DIASTOLIC BLOOD PRESSURE: 82 MMHG | SYSTOLIC BLOOD PRESSURE: 115 MMHG | HEIGHT: 65 IN | WEIGHT: 283 LBS | HEART RATE: 74 BPM | BODY MASS INDEX: 47.15 KG/M2

## 2019-11-22 DIAGNOSIS — M75.41 SUBACROMIAL IMPINGEMENT OF RIGHT SHOULDER: Primary | ICD-10-CM

## 2019-11-22 DIAGNOSIS — M25.511 PAIN IN RIGHT ACROMIOCLAVICULAR JOINT: ICD-10-CM

## 2019-11-22 PROCEDURE — 99213 OFFICE O/P EST LOW 20 MIN: CPT | Performed by: ORTHOPAEDIC SURGERY

## 2019-11-22 PROCEDURE — 20606 DRAIN/INJ JOINT/BURSA W/US: CPT | Performed by: ORTHOPAEDIC SURGERY

## 2019-11-22 PROCEDURE — 20611 DRAIN/INJ JOINT/BURSA W/US: CPT | Performed by: ORTHOPAEDIC SURGERY

## 2019-11-22 RX ORDER — LIDOCAINE HYDROCHLORIDE 10 MG/ML
0.5 INJECTION, SOLUTION INFILTRATION; PERINEURAL
Status: COMPLETED | OUTPATIENT
Start: 2019-11-22 | End: 2019-11-22

## 2019-11-22 RX ORDER — TRIAMCINOLONE ACETONIDE 40 MG/ML
20 INJECTION, SUSPENSION INTRA-ARTICULAR; INTRAMUSCULAR
Status: COMPLETED | OUTPATIENT
Start: 2019-11-22 | End: 2019-11-22

## 2019-11-22 RX ORDER — LIDOCAINE HYDROCHLORIDE 10 MG/ML
4 INJECTION, SOLUTION INFILTRATION; PERINEURAL
Status: COMPLETED | OUTPATIENT
Start: 2019-11-22 | End: 2019-11-22

## 2019-11-22 RX ORDER — TRIAMCINOLONE ACETONIDE 40 MG/ML
40 INJECTION, SUSPENSION INTRA-ARTICULAR; INTRAMUSCULAR
Status: COMPLETED | OUTPATIENT
Start: 2019-11-22 | End: 2019-11-22

## 2019-11-22 RX ADMIN — LIDOCAINE HYDROCHLORIDE 0.5 ML: 10 INJECTION, SOLUTION INFILTRATION; PERINEURAL at 13:18

## 2019-11-22 RX ADMIN — TRIAMCINOLONE ACETONIDE 40 MG: 40 INJECTION, SUSPENSION INTRA-ARTICULAR; INTRAMUSCULAR at 12:45

## 2019-11-22 RX ADMIN — LIDOCAINE HYDROCHLORIDE 4 ML: 10 INJECTION, SOLUTION INFILTRATION; PERINEURAL at 12:45

## 2019-11-22 RX ADMIN — TRIAMCINOLONE ACETONIDE 20 MG: 40 INJECTION, SUSPENSION INTRA-ARTICULAR; INTRAMUSCULAR at 13:18

## 2019-11-22 NOTE — PROGRESS NOTES
Large joint arthrocentesis: R subacromial bursa  Date/Time: 11/22/2019 12:45 PM  Consent given by: parent  Site marked: site marked  Timeout: Immediately prior to procedure a time out was called to verify the correct patient, procedure, equipment, support staff and site/side marked as required   Supporting Documentation  Indications: pain and diagnostic evaluation   Procedure Details  Location: shoulder - R subacromial bursa  Preparation: Patient was prepped and draped in the usual sterile fashion  Needle size: 25 G  Ultrasound guidance: yes (Later ultrasound transducer used with sterile transducer cover and sterile ultrasound gel for needle guidance)  Approach: lateral  Medications administered: 4 mL lidocaine 1 %; 40 mg triamcinolone acetonide 40 mg/mL    Patient tolerance: patient tolerated the procedure well with no immediate complications  Dressing:  Sterile dressing applied    Medium joint arthrocentesis: R acromioclavicular  Date/Time: 11/22/2019 1:18 PM  Supporting Documentation  Indications: pain and diagnostic evaluation   Procedure Details  Location: shoulder - R acromioclavicular  Preparation: Patient was prepped and draped in the usual sterile fashion  Needle size: 25 G  Ultrasound guidance: yes (Sterile ultrasound transducer cover and sterile ultrasound gel used for needle guidance in short axis approach)  Approach: superior (Short axis approach)  Medications administered: 0 5 mL lidocaine 1 %; 20 mg triamcinolone acetonide 40 mg/mL    Patient tolerance: patient tolerated the procedure well with no immediate complications  Dressing:  Sterile dressing applied

## 2019-12-13 ENCOUNTER — TELEPHONE (OUTPATIENT)
Dept: OBGYN CLINIC | Facility: HOSPITAL | Age: 34
End: 2019-12-13

## 2019-12-13 ENCOUNTER — TELEPHONE (OUTPATIENT)
Dept: OBGYN CLINIC | Facility: OTHER | Age: 34
End: 2019-12-13

## 2019-12-13 NOTE — TELEPHONE ENCOUNTER
Left message for Elvie Pires to change her appointment tomyu next Friday, December 20th from 10:30 to 12:15pm   I asked her to call and confirm this message either way    Again, her appointment December 20th with Dr Bill Gill changed from 10:30am to 12:15pm

## 2019-12-13 NOTE — TELEPHONE ENCOUNTER
Clifford from [facility] called to confirm patient's next office visit and the last office visit

## 2019-12-19 ENCOUNTER — TELEPHONE (OUTPATIENT)
Dept: OBGYN CLINIC | Facility: CLINIC | Age: 34
End: 2019-12-19

## 2019-12-19 NOTE — TELEPHONE ENCOUNTER
Ankita Tobias again yesterday, unable to actually speak to her  So I left a message again stating that her appointment for Friday December 20th got changed from 10:15 to 12:30  I asked her to kindly call us back to either confirm or reschedule that appointment

## 2019-12-20 ENCOUNTER — OFFICE VISIT (OUTPATIENT)
Dept: OBGYN CLINIC | Facility: OTHER | Age: 34
End: 2019-12-20
Payer: OTHER MISCELLANEOUS

## 2019-12-20 DIAGNOSIS — M75.102 BILATERAL ROTATOR CUFF SYNDROME: Primary | ICD-10-CM

## 2019-12-20 DIAGNOSIS — M75.101 BILATERAL ROTATOR CUFF SYNDROME: Primary | ICD-10-CM

## 2019-12-20 PROCEDURE — 99213 OFFICE O/P EST LOW 20 MIN: CPT | Performed by: ORTHOPAEDIC SURGERY

## 2019-12-20 NOTE — PROGRESS NOTES
Assessment:       1  Bilateral rotator cuff syndrome          Plan:    Work-related injury  DOI:  01/04/2018    I discussed my radiological and clinical findings with patient today  Her clinical exam is consistent with bilateral rotator cuff syndrome  Patient reports improvement of pain after ultrasound-guided cortisone injection of her right acromioclavicular joint as well as her right subacromial bursa  Her neck pain resolved  She does have limited active range of motion in both shoulders  I think she would benefit from physical therapy  Will follow up her in 3 months    Subjective:     Patient ID: Samy Conteh is a 30 y o  female  Chief Complaint:  Follow-up for right shoulder pain, right neck pain    HPI  28-year-old left dominant hand female here for follow-up of right shoulder pain after work injury  Injury occurred on 01/04/2018  (patient reports working at [employer], was working on a machine in which steering got stuck and ran into a wall causing a jerking motion) in which she had a cervical XR that was unremarkable  Since then, she has had prior MRI and EMG done  She has attended physical therapy and chiropractor which initially was improving symptoms but has not fully improved symptoms  MRI 04/25/2018 was normal, EMG on 06/08/2018 reported"very mild" left carpal tunnel syndrome  She received ultrasound-guided cortisone injection to right acromioclavicular joint as well as right subacromial bursa on 11/12/2019  On today's presentation patient reports improvement of her symptoms  She states her neck pain resolved  Right shoulder pain is 5/10 from 7/10 prior to injection  Located to anterior shoulder, nonradiating, numbness or weakness  Patient is worse when she is sleeping on her right shoulder and with movement    Social History     Occupational History    Not on file   Tobacco Use    Smoking status: Never Smoker    Smokeless tobacco: Never Used   Substance and Sexual Activity    Alcohol use: No    Drug use: No    Sexual activity: Yes     Partners: Male      Review of Systems   Constitutional: Negative for chills, fever and unexpected weight change  HENT: Negative for hearing loss, nosebleeds and sore throat  Eyes: Negative for pain, redness and visual disturbance  Respiratory: Negative for cough, shortness of breath and wheezing  Cardiovascular: Negative for chest pain, palpitations and leg swelling  Gastrointestinal: Negative for abdominal pain, nausea and vomiting  Endocrine: Negative for polydipsia and polyuria  Genitourinary: Negative for dysuria and hematuria  Musculoskeletal: Negative for arthralgias, joint swelling and myalgias  Skin: Negative for rash and wound  Neurological: Negative for dizziness, numbness and headaches  Psychiatric/Behavioral: Negative for decreased concentration and suicidal ideas  The patient is not nervous/anxious  Objective:     Ortho ExamPhysical Exam   Constitutional: She is oriented to person, place, and time  She appears well-developed and well-nourished  No distress  HENT:   Head: Normocephalic  Eyes: Conjunctivae are normal    Neck: Normal range of motion  Pulmonary/Chest: Effort normal  No respiratory distress  Abdominal: She exhibits no distension  Neurological: She is alert and oriented to person, place, and time  Skin: Skin is warm  Capillary refill takes less than 2 seconds  No erythema  Psychiatric: She has a normal mood and affect  Nursing note and vitals reviewed        Cervical  ROM: intact  Tenderness: no spinous process tenderness;   Sensation UE Bilateral:  C5: normal  C6: normal  C7: normal  C8: normal  T1: normal  Strength UE: 5/5 elbow, wrist, fingers bilatearl  Reflexes: symmetric bilateral triceps, biceps, corachobrachiais    Right SHOULDER:  Erythema: no  Swelling: no  Increased Warmth: no    Tenderness:  AC joint and anterior shoulder    ROM  Abduction:  Active 90°, passive 170  Forward flexion:  Active 110°, passive 160  External rotation:  60° in adduction  Internal rotation: L2-L3 level    Strength  Abduction: 4/5  Adduction: 5/5  ER: 5/5  IR: 5/5    Drop-Arm: negative  Emptycan: Discomfort  Belly Press: negative  Lift-off Test:  Negative     Johnson:  Positive  Neer:  Positive  Cross-Arm: negative  Speeds: negative  [place]'s: Negative  Apprehension: Negative        Left SHOULDER:  Strength  Abduction: 4+/5  Adduction: 5/5  ER: 5/5  IR: 5/5    ROM  Abduction:  Active 90°, passive 170  Forward flexion:  Active 110°, passive 160  External rotation:  60°  Internal Rotation: L2-L3 level    Empty can: Discomfort  Neer's : Equivocal  [place]'s: Equivocal  Osuna's: Negative  Speed's negative  Apprehension: Negative      I interviewed, took the history and examined the patient  I discussed the case with the Resident and reviewed the Residents note , prescribed medications, and orders placed  I supervised the Resident and I agree with the Resident management plan as it was presented to me  I was present in the clinic and examined the patient      Edmar Mari MD 12/23/19

## 2019-12-24 ENCOUNTER — TELEPHONE (OUTPATIENT)
Dept: PAIN MEDICINE | Facility: CLINIC | Age: 34
End: 2019-12-24

## 2019-12-24 NOTE — TELEPHONE ENCOUNTER
[employer] called left a message stating they are trying to confirm pt appt on 12/20 if they can have a call back number is [phone]  Bubba Bruce    Claim number [id number]

## 2019-12-27 ENCOUNTER — TELEPHONE (OUTPATIENT)
Dept: OBGYN CLINIC | Facility: HOSPITAL | Age: 34
End: 2019-12-27

## 2019-12-27 NOTE — TELEPHONE ENCOUNTER
Patient sees Dr Brisa Bo at [facility] is calling in wanting to get office notes faxed over to them    Fax # [phone]

## 2020-12-03 LAB
EXTERNAL CHLAMYDIA SCREEN: NORMAL
EXTERNAL GONORRHEA SCREEN: NORMAL

## 2020-12-21 LAB
EXTERNAL ABO GROUPING: NORMAL
EXTERNAL ANTIBODY SCREEN: NORMAL
EXTERNAL HEMOGLOBIN: 12.9 G/DL
EXTERNAL HEPATITIS B SURFACE ANTIGEN: NORMAL
EXTERNAL HIV-1/2 AB-AG: NORMAL
EXTERNAL PLATELET COUNT: 228 K/ΜL
EXTERNAL RH FACTOR: POSITIVE
EXTERNAL RUBELLA IGG QUANTITATION: NORMAL
EXTERNAL SYPHILIS RPR SCREEN: NORMAL

## 2021-04-20 ENCOUNTER — ROUTINE PRENATAL (OUTPATIENT)
Dept: OBGYN CLINIC | Facility: CLINIC | Age: 36
End: 2021-04-20
Payer: COMMERCIAL

## 2021-04-20 VITALS
BODY MASS INDEX: 47.32 KG/M2 | SYSTOLIC BLOOD PRESSURE: 110 MMHG | HEIGHT: 65 IN | WEIGHT: 284 LBS | DIASTOLIC BLOOD PRESSURE: 70 MMHG

## 2021-04-20 DIAGNOSIS — Z3A.28 28 WEEKS GESTATION OF PREGNANCY: Primary | ICD-10-CM

## 2021-04-20 LAB
SL AMB  POCT GLUCOSE, UA: NORMAL
SL AMB POCT URINE PROTEIN: NORMAL

## 2021-04-20 PROCEDURE — 99213 OFFICE O/P EST LOW 20 MIN: CPT | Performed by: OBSTETRICS & GYNECOLOGY

## 2021-04-20 NOTE — PATIENT INSTRUCTIONS
Fetal movement  Best indicator of  How baby is doing  Report decreased  Movement,  Leaking of  Fluid  Or grater than 4  Contractions in an hour    Fu in 2 weeks

## 2021-04-21 DIAGNOSIS — O24.410 GDM (GESTATIONAL DIABETES MELLITUS), CLASS A1: Primary | ICD-10-CM

## 2021-04-22 LAB
ERYTHROCYTE [DISTWIDTH] IN BLOOD BY AUTOMATED COUNT: 12.5 % (ref 11.7–15.4)
GLUCOSE 1H P 50 G GLC PO SERPL-MCNC: 175 MG/DL (ref 65–139)
HCT VFR BLD AUTO: 38 % (ref 34–46.6)
HGB BLD-MCNC: 12.4 G/DL (ref 11.1–15.9)
MCH RBC QN AUTO: 27.9 PG (ref 26.6–33)
MCHC RBC AUTO-ENTMCNC: 32.6 G/DL (ref 31.5–35.7)
MCV RBC AUTO: 86 FL (ref 79–97)
PLATELET # BLD AUTO: 192 X10E3/UL (ref 150–450)
RBC # BLD AUTO: 4.44 X10E6/UL (ref 3.77–5.28)
RPR SER QL: NON REACTIVE
WBC # BLD AUTO: 9 X10E3/UL (ref 3.4–10.8)

## 2021-04-23 NOTE — RESULT ENCOUNTER NOTE
Please place MFM/DIPP to facilitate consult, thank you  Pt aware she may proceed to DIPP once referral placed

## 2021-04-25 PROBLEM — O24.410 GDM (GESTATIONAL DIABETES MELLITUS), CLASS A1: Status: ACTIVE | Noted: 2021-04-25

## 2021-04-26 ENCOUNTER — TELEPHONE (OUTPATIENT)
Dept: OBGYN CLINIC | Facility: CLINIC | Age: 36
End: 2021-04-26

## 2021-04-26 NOTE — TELEPHONE ENCOUNTER
Informed [place] of MFM referal was sent today  Provided MFM Dept  Phone # incase not contacted in the next several days for an appointment

## 2021-05-03 ENCOUNTER — TELEMEDICINE (OUTPATIENT)
Dept: PERINATAL CARE | Facility: CLINIC | Age: 36
End: 2021-05-03
Payer: COMMERCIAL

## 2021-05-03 ENCOUNTER — ROUTINE PRENATAL (OUTPATIENT)
Dept: OBGYN CLINIC | Facility: CLINIC | Age: 36
End: 2021-05-03
Payer: COMMERCIAL

## 2021-05-03 VITALS
BODY MASS INDEX: 47.15 KG/M2 | HEIGHT: 65 IN | SYSTOLIC BLOOD PRESSURE: 112 MMHG | DIASTOLIC BLOOD PRESSURE: 72 MMHG | WEIGHT: 283 LBS

## 2021-05-03 DIAGNOSIS — O24.419 GESTATIONAL DIABETES MELLITUS (GDM) IN THIRD TRIMESTER, GESTATIONAL DIABETES METHOD OF CONTROL UNSPECIFIED: Primary | ICD-10-CM

## 2021-05-03 DIAGNOSIS — O24.410 GDM (GESTATIONAL DIABETES MELLITUS), CLASS A1: ICD-10-CM

## 2021-05-03 DIAGNOSIS — Z3A.30 30 WEEKS GESTATION OF PREGNANCY: Primary | ICD-10-CM

## 2021-05-03 DIAGNOSIS — Z3A.30 30 WEEKS GESTATION OF PREGNANCY: ICD-10-CM

## 2021-05-03 LAB
SL AMB  POCT GLUCOSE, UA: NORMAL
SL AMB POCT URINE PROTEIN: NORMAL

## 2021-05-03 PROCEDURE — G0109 DIAB MANAGE TRN IND/GROUP: HCPCS | Performed by: DIETITIAN, REGISTERED

## 2021-05-03 PROCEDURE — 99213 OFFICE O/P EST LOW 20 MIN: CPT | Performed by: OBSTETRICS & GYNECOLOGY

## 2021-05-03 RX ORDER — BLOOD SUGAR DIAGNOSTIC
STRIP MISCELLANEOUS
Qty: 100 EACH | Refills: 3 | Status: SHIPPED | OUTPATIENT
Start: 2021-05-03 | End: 2021-08-20

## 2021-05-03 RX ORDER — LANCETS 33 GAUGE
EACH MISCELLANEOUS
Qty: 100 EACH | Refills: 3 | Status: SHIPPED | OUTPATIENT
Start: 2021-05-03 | End: 2021-08-20

## 2021-05-03 RX ORDER — BLOOD-GLUCOSE METER
EACH MISCELLANEOUS
Qty: 1 KIT | Refills: 0 | Status: SHIPPED | OUTPATIENT
Start: 2021-05-03 | End: 2021-08-20

## 2021-05-03 NOTE — PROGRESS NOTES
Thank you for referring your patient to [facility] Maternal Fetal Medicine Diabetes Education Program      Yolanda Garcia is a  37 y o  female who presents today for Initial visit (class 1)  Patient is at 30w2d gestation, Estimated Date of Delivery: 7/10/21  Reviewed and updated the following from patients medical record: PMH, Problem List, Allergies, and Current Medications  Diagnosis:  Medical Diagnosis/ICD 10:Gestational Diabetes third trimester method of control unspecified  Hx: insulin controlled GDM in prior pregnancy (2019)    Discussed with patient what GDM is, pathophysiology of GDM, untreated GDM and maternal fetal complications including fetal macrosomia,  hypoglycemia, polyhydramnios, increased incidence of  section,  labor, and in severe cases fetal demise and still birth   Discussed importance of blood glucose monitoring, nutrition, and medication if necessary in achieving BG goals  Discussed resumption of non-diabetic state post delivery, but reviewed increased risk of Type 2 DM later in life and ways to reduce risk by maintaining a healthy weight and eating habits  PMH/PSH:  Past Medical History:   Diagnosis Date    Gestational diabetes     History of atrial fibrillation     Pt reports was stress related and resolved    HPV in female     Obesity, unspecified     Ovarian cyst     cysts     Papanicolaou smear 2018    Urinary frequency      Past Surgical History:   Procedure Laterality Date    MANDIBLE SURGERY  2016    MOUTH SURGERY      OTHER SURGICAL HISTORY      jaw surgery       Labs:  No components found for: [employer] [place]  Lab Results   Component Value Date     2019     Lab Results   Component Value Date    [id number] 175 (H) 2021       Prefers to be referred to DIPP to start classes and blood sugar monitoring rather than complete the 3 hr GTT        Medications:  Current Outpatient Medications on File Prior to Visit   Medication Sig Dispense Refill    Prenatal MV-Min-Fe Fum-FA-DHA (PRENATAL+DHA PO) Take by mouth       No current facility-administered medications on file prior to visit  Ordered one touch verio flex meter    Recent Ultrasound Report:   DATE:No MFM US to review, [facility] US completed [facility]      Anthropometrics:  Ht Readings from Last 3 Encounters:   21 5' 5" (1 651 m)   21 5' 5" (1 651 m)   19 5' 5" (1 651 m)     Wt Readings from Last 3 Encounters:   21 128 kg (283 lb)   21 129 kg (284 lb)   19 128 kg (283 lb)     Pre-gravid weight: 283 lbs  Pre-gravid BMI: 47   Weight Change: 0 lb weigh gain  Weight gain recommendations: BMI (> 30) 11-20 lbs    Blood Glucose Monitoring:   Glucose Meter: OneTouch Verio Flex  Instructed on testing blood sugars: 4 x per day (Fasting, 2 hour after start of each meal)    Gave instruction on site selection, skin preparation, loading strips and lancet device, meter activation, obtaining blood sample, test strip and lancet disposal and storage, and recording log book entries  Patient has good understanding of material covered and was able to test their own blood sugar in office today  Instruction for reporting blood sugar results weekly via:   Phone: [phone]   Fax: [phone]   My Chart (Message, or Glucose Flowsheet)    Goal Blood Sugar Ranges:    Fastin-90 mg/dL   1 hour after the start of each meal: 135 mg/dL or <   2 hours after start of each meal: 120 mg/dL or <      Meal Plan (daily calorie and protein needs):  Calories: 2000 calorie (CHO:45-15-60-15-60-30) (PRO: 2/3-1-3/4-1-3/4-2)  Protein:142 gm/day    Diet Instruction:  1  Patient was provided with a meal plan including 3 meals and 3 snacks  2  Discussed appropriate amounts of CHO, PRO, and Fat at each meal and snack  3  Reviewed CHO exchange list, and portion sizes for both CHO and PRO via food models  4  Instruction on how to read a food label  5   Provided suggested meal/snack options to increase nutrition and maintain consistent meal and snack intakes  6  Instructed on how to keep a 3-day food diary to be brought to follow- up appointment  7  Encouraged  patient to eat every 2 0-3 5 hours while awake  8  Encouraged patient to go no longer than 8-10 hours fasting overnight until first meal of the day  Physical Activity:  Discussed benefits of physical activity to optimize blood glucose control, encouraged activity at patient is physically able  Always consult a physician prior to starting an exercise program  Recommend 20-30 minutes daily  Nutrition Diagnosis Statement:  Nutrition Diagnosis: Altered nutrition related lab values (glucose)  Related to: GDM  As evidenced by: 1 hr  mg/dL    Goals:  1  Fasting BG 60-90 mg/dL  2  2 hr PP Blood Glucose <120 mg/dL (1 hr PP <135)  3  GDM Diet     Monitoring and Evaluation  1  Adherence to GDM diet  2  Blood Glucose Monitoring   3  Weight/ Body Composition      Patient Stated Goal: "I will eat 3 meals and 3 snacks each day, including protein at each"    Diabetes Self Management Support Plan outside of ongoing care: Spouse/Family    Learner/s Present:Learners Present: Patient   Educational Materials Provided: Diabetes in Pregnancy Booklet, Meal Plan 2000, 3-day Food Log, Self Assessment Form and Goal Sheet  Teaching methods used: Teaching Methods MFM: Listened to Instruction, Visualized Demonstration, Label Reading, Food Models and Meter Teaching/Demo  Patients Response to Education Information Provided: Voices Understanding  Readiness to Change: Preparation (Preparing to make changes)  Barriers to Learning/Change: no barriers  Expected Compliance: good    Date to report blood sugars: Sunday, 5/9/21 prior to class 2  Class 2 (date): Monday, 5/10/21    Begin Time: 1:00 pm  End Time: 2:00 pm    It was a pleasure working with them today  Please feel free to call with any questions or concerns      Chan Galdamez RD, JOHANN  Diabetes Educator  [facility] Maternal Fetal Medicine  Diabetes in Pregnancy Program  [address] [facility] [address] [facility]  Virtual Regular Visit      Assessment/Plan:    Problem List Items Addressed This Visit        Endocrine    GDM (gestational diabetes mellitus), class A1      Other Visit Diagnoses     Gestational diabetes mellitus (GDM) in third trimester, gestational diabetes method of control unspecified    -  Primary    30 weeks gestation of pregnancy                   Reason for visit is   Chief Complaint   Patient presents with    Virtual Regular Visit        Encounter provider Shelby Abbott    Provider located at [address] [place] [id number] [phone]      Recent Visits  No visits were found meeting these conditions  Showing recent visits within past 7 days and meeting all other requirements     Today's Visits  Date Type Provider Dept   05/03/21 Telemedicine [facility]   Showing today's visits and meeting all other requirements     Future Appointments  No visits were found meeting these conditions  Showing future appointments within next 150 days and meeting all other requirements        The patient was identified by name and date of birth  Brisa Chapman was informed that this is a telemedicine visit and that the visit is being conducted through [address] [place] [address] Now and patient was informed that this is a secure, HIPAA-compliant platform  She agrees to proceed     My office door was closed  No one else was in the room  She acknowledged consent and understanding of privacy and security of the video platform  The patient has agreed to participate and understands they can discontinue the visit at any time  Patient is aware this is a billable service  Subjective  Brisa Chapman is a 37 y o  female          HPI     Past Medical History:   Diagnosis Date    Gestational diabetes     History of atrial fibrillation     Pt reports was stress related and resolved    HPV in female     Obesity, unspecified     Ovarian cyst     cysts     Papanicolaou smear 05/30/2018    Urinary frequency        Past Surgical History:   Procedure Laterality Date    MANDIBLE SURGERY  06/2016    MOUTH SURGERY      OTHER SURGICAL HISTORY      jaw surgery       Current Outpatient Medications   Medication Sig Dispense Refill    Prenatal MV-Min-Fe Fum-FA-DHA (PRENATAL+DHA PO) Take by mouth       No current facility-administered medications for this visit  Allergies   Allergen Reactions    Sulfa Antibiotics Rash    Latex        Review of Systems    Video Exam    There were no vitals filed for this visit  Physical Exam     I spent 60 minutes directly with the patient during this visit      VIRTUAL VISIT DISCLAIMER    Jl Lorenz acknowledges that she has consented to an online visit or consultation  She understands that the online visit is based solely on information provided by her, and that, in the absence of a face-to-face physical evaluation by the physician, the diagnosis she receives is both limited and provisional in terms of accuracy and completeness  This is not intended to replace a full medical face-to-face evaluation by the physician  Jl Lorenz understands and accepts these terms

## 2021-05-03 NOTE — PROGRESS NOTES
Routine Prenatal Visit  [address] [facility], [address], [place] [facility], [address]    Assessment/Plan:  Aldo Singh is a 37y o  year old  at 30w2d who presents for routine prenatal visit  1  30 weeks gestation of pregnancy  -     POCT urine dip    2  GDM (gestational diabetes mellitus), class A1          Subjective:     CC: Prenatal care    Donta Webster is a 37 y o   female who presents for routine prenatal care at 30w2d  Pregnancy ROS:  No leakage of fluid, pelvic pain, or vaginal bleeding   + fetal movement  The following portions of the patient's history were reviewed and updated as appropriate: allergies, current medications, past family history, past medical history, obstetric history, gynecologic history, past social history, past surgical history and problem list       Objective:  /72   Ht 5' 5" (1 651 m)   Wt 128 kg (283 lb)   LMP 10/03/2020 (Exact Date)   BMI 47 09 kg/m²   Pregravid Weight/BMI: Pregravid weight not on file (BMI Could not be calculated)  Current Weight: 128 kg (283 lb)   Total Weight Gain: Not found  Pre- Vitals      Most Recent Value   Prenatal Assessment   Fetal Heart Rate  138-144   Fundal Height (cm)  31 cm   Movement  Present   Prenatal Vitals   Blood Pressure  112/72   Weight - Scale  128 kg (283 lb)   Urine Albumin/Glucose   Dilation/Effacement/Station   Vaginal Drainage   Edema   LLE Edema  None   RLE Edema  None   Facial Edema  None           General: Well appearing, no distress  Respiratory: Unlabored breathing  Cardiovascular: Regular rate  Abdomen: Soft, gravid, nontender  Fundal Height: Appropriate for gestational age  Extremities: Warm and well perfused  Non tender  Has  Teaching  For  Sugar  Testing and  Diet  Today and  Fu I one  Week  + fm  No  UTCx no leaking of  Fluid

## 2021-05-03 NOTE — PATIENT INSTRUCTIONS
1  Continue Meal Plan consisting of 3 meals and 3 snacks daily, including protein at each  2  Try to eat every 2-3 5 hours while awake  3  Do not exceed 8-10 hours fasting overnight  4  Continue self-monitoring blood glucose: 4 x per day (Fasting, 2 hour after start of each meal)  Goal: fasting glucose 90 mg/dL or less, and 2 hrs after the start of each meal 120 mg/dL or less (1 hr after the start of each meal 135 mg/dL or less)  5  Submit blood sugar values weekly via: Telephone  at [phone] or [employer] messaging or [employer] glucose flowsheet  6  If no restriction from physician, recommend up to 30 minutes walking daily  7  Report blood sugars on Sunday, 5/9/21 prior to class 2  8  Complete 3 day food log, self assessment form and send in via [employer] message as image attachment to [facility] provider prior to class 2 appointment     9  Class 2 follow up appointment scheduled for Monday, 5/10/21

## 2021-05-07 ENCOUNTER — DOCUMENTATION (OUTPATIENT)
Dept: PERINATAL CARE | Facility: CLINIC | Age: 36
End: 2021-05-07

## 2021-05-07 ENCOUNTER — TELEMEDICINE (OUTPATIENT)
Dept: PERINATAL CARE | Facility: CLINIC | Age: 36
End: 2021-05-07
Payer: COMMERCIAL

## 2021-05-07 DIAGNOSIS — Z86.32 HISTORY OF INSULIN CONTROLLED GESTATIONAL DIABETES MELLITUS: ICD-10-CM

## 2021-05-07 DIAGNOSIS — O24.414 INSULIN CONTROLLED GESTATIONAL DIABETES MELLITUS (GDM) IN THIRD TRIMESTER: Primary | ICD-10-CM

## 2021-05-07 DIAGNOSIS — Z3A.30 30 WEEKS GESTATION OF PREGNANCY: ICD-10-CM

## 2021-05-07 DIAGNOSIS — E03.9 ACQUIRED HYPOTHYROIDISM: ICD-10-CM

## 2021-05-07 DIAGNOSIS — O99.210 OBESITY AFFECTING PREGNANCY, ANTEPARTUM: ICD-10-CM

## 2021-05-07 PROCEDURE — G0108 DIAB MANAGE TRN  PER INDIV: HCPCS

## 2021-05-07 RX ORDER — INSULIN GLARGINE 100 [IU]/ML
INJECTION, SOLUTION SUBCUTANEOUS
Qty: 15 ML | Refills: 0 | Status: SHIPPED | OUTPATIENT
Start: 2021-05-07 | End: 2021-05-14 | Stop reason: SINTOL

## 2021-05-07 NOTE — PROGRESS NOTES
Virtual Regular Visit      Assessment/Plan:    Problem List Items Addressed This Visit        Endocrine    Acquired hypothyroidism    Insulin controlled gestational diabetes mellitus (GDM) in third trimester - Primary       Other    Obesity affecting pregnancy, antepartum    35 weeks gestation of pregnancy               Reason for visit is   Chief Complaint   Patient presents with    Gestational Diabetes    Patient Education    Virtual Regular Visit        Encounter provider Tasha Lugo    Provider located at [address] [place] [address] Dr Kirill Bello [place] [id number] [phone]      Recent Visits  Date Type Provider Dept   05/03/21 Colby Yarbrough [address]   Showing recent visits within past 7 days and meeting all other requirements     Today's Visits  Date Type Provider Dept   05/07/21 14[address]   Showing today's visits and meeting all other requirements     Future Appointments  No visits were found meeting these conditions  Showing future appointments within next 150 days and meeting all other requirements        The patient was identified by name and date of birth  Marilou Dougherty was informed that this is a telemedicine visit and that the visit is being conducted through [address] [place] [address] Now and patient was informed that this is a secure, HIPAA-compliant platform  She agrees to proceed     My office door was closed  No one else was in the room  She acknowledged consent and understanding of privacy and security of the video platform  The patient has agreed to participate and understands they can discontinue the visit at any time  Patient is aware this is a billable service  Subjective  Marilou Dougherty is a 37 y  o pregnant female         HPI     Past Medical History:   Diagnosis Date    Gestational diabetes     History of atrial fibrillation     Pt reports was stress related and resolved    HPV in female     Obesity, unspecified     Ovarian cyst     cysts     Papanicolaou smear 2018    Urinary frequency        Past Surgical History:   Procedure Laterality Date    MANDIBLE SURGERY  2016    MOUTH SURGERY      OTHER SURGICAL HISTORY      jaw surgery       Current Outpatient Medications   Medication Sig Dispense Refill    Blood Glucose Monitoring Suppl (OneTouch Verio) w/Device KIT Dispense 1 kit per insurance formulary  Gestational Diabetes  1 kit 0    OneTouch Delica Lancets 38H MISC Use 4 per day or as directed  Gestational Diabetes  100 each 3    OneTouch Verio test strip Test 4 times per day or as directed  Gestational Diabetes  100 each 3    Prenatal MV-Min-Fe Fum-FA-DHA (PRENATAL+DHA PO) Take by mouth       No current facility-administered medications for this visit  Allergies   Allergen Reactions    Sulfa Antibiotics Rash    Latex        Review of Systems    Video Exam    There were no vitals filed for this visit  Physical Exam     I spent 30 minutes with patient today in which greater than 50% of the time was spent in counseling/coordination of care regarding insulin controlled gestational diabetes in the third trimester  VIRTUAL VISIT DISCLAIMER    Bere Medina acknowledges that she has consented to an online visit or consultation  She understands that the online visit is based solely on information provided by her, and that, in the absence of a face-to-face physical evaluation by the physician, the diagnosis she receives is both limited and provisional in terms of accuracy and completeness  This is not intended to replace a full medical face-to-face evaluation by the physician  Bere Medina understands and accepts these terms  DATE: 21   RE: Bere Medina   : 1985  MACHO: Estimated Date of Delivery: 7/10/21  EGA:  30w6d  Referring Provider: Sofiya Cameron      Your patient was seen in the office today for insulin teaching    Please refer to Diabetes and Pregnancy Progress Record for complete documentation of patients blood glucose levels  Height: 65 inches   Weight: 283 pounds    The patient was instructed on the following:     Lantus SoloStar Pen use  Initiate 30 units at 9 PM    Insulin administration times, insulin action   Hypoglycemia signs, symptoms and treatment   Increase in maternal-fetal surveillance with insulin initiation   Side effects of hyperglycemia in pregnancy including macrosomia,  hypoglycemia, polyhydramnios, pre-term labor and stillbirth   Continue to monitor blood glucoses via fingerstick fasting (goal 60 mg/dl to 90 mg/dl) and two hours post prandial (goal less than 120 mg/dl)   Follow up with our office on Monday, 5/10 for evaluation of blood glucose levels   Non-stress testing two times weekly and AN testing beginning at 32 weeks gestation   Ultrasounds every 4 weeks at the [address]   HbA1c every 6 to 8 weeks, CMP ordered  Diabetes Self Management Support Plan outside of ongoing care: Spouse/Family    Thank you for the opportunity to participate in the care of this patient  I can be reached at [phone] should you have any questions  Time spent with patient 11:30 am-12:00 pm ; time spent face to face counseling greater than 50% of the appointment      Mana Garcia RD,LDN,CDE  Diabetes Educator  Diabetes and Pregnancy Program

## 2021-05-07 NOTE — PROGRESS NOTES
Date:  21  RE: Yolanda Garcia    : 1985  Estimated Date of Delivery: 7/10/21  EGA: 30w6d  OB/GYN: [facility]      Date Fasting Post-  breakfast Post-  lunch Post-  dinner Before bedtime Carbs Comments   5/3          5/4 123 98 107 128  Dinner roll       116 115 118 104       118 120 107 108       107 111                              History of insulin controlled GDM in 2019  Patient reported blood sugars at insulin education appointment    Current regimen:  2000 calorie gestational diabetes meal plan; 3 meals and 3 snacks including a combination of carbohydrate, protein and fat  SMBG 4 times per day; fasting and 2 hrs pp with a One-Touch Verio blood glucose meter  Plan:  Start Lantus- 30 units at 9 PM daily  SMBG 4 x per day (Fasting, 2 hour after start of each meal) using OneTouch Verio Flex glucose meter  Requested patient check 3 am blood sugar until next report on Monday, 5/10 at class 2 appointment  Continue meal plan consisting of 3 meals and 3 snacks, including protein at each  Patient has changed bedtime snack to 1 serving CHO (15 gms) and 2-3 ounces of protein  If okay by OB, walk 20-30 minutes daily  Prescriptions provided for A1c and CMP; emailed to patient since she completes at [employer]    Patient reported last US was completed at [facility] at 20 weeks  Report no available in media  Patient to call MFM  at [phone] to schedule MFM ultrasound  Advised with the start of medication twice weekly testing; AN and NST's will need to be scheduled at 32 weeks     Diabetes Self Management Support Plan outside of ongoing care: Spouse/Family    Date due to report next:  Monday, 5/10  Patient to report via [employer] on future reports    Bridget Mann, RD,LDN,CDE  Diabetes Educator   Diabetes and Pregnancy Program

## 2021-05-10 ENCOUNTER — TELEMEDICINE (OUTPATIENT)
Dept: PERINATAL CARE | Facility: CLINIC | Age: 36
End: 2021-05-10
Payer: COMMERCIAL

## 2021-05-10 ENCOUNTER — DOCUMENTATION (OUTPATIENT)
Dept: PERINATAL CARE | Facility: CLINIC | Age: 36
End: 2021-05-10

## 2021-05-10 DIAGNOSIS — O24.414 INSULIN CONTROLLED GESTATIONAL DIABETES MELLITUS (GDM) IN THIRD TRIMESTER: Primary | ICD-10-CM

## 2021-05-10 DIAGNOSIS — Z3A.31 31 WEEKS GESTATION OF PREGNANCY: ICD-10-CM

## 2021-05-10 PROCEDURE — G0109 DIAB MANAGE TRN IND/GROUP: HCPCS | Performed by: DIETITIAN, REGISTERED

## 2021-05-10 NOTE — PROGRESS NOTES
Thank you for referring your patient to [facility] Maternal Fetal Medicine Diabetes Education Program      Cari Henderson is a  37 y o  female who presents today for group class 2 via [employer] virtual   Patient is at 31w6d gestation, Estimated Date of Delivery: 7/10/21  Reviewed and updated the following from patients medical record: PMH, Problem List, Allergies, and Current Medications  Diagnosis:  Medical Diagnosis/ICD 10:Gestational Diabetes third trimester method of control unspecified  Hx: insulin controlled GDM in prior pregnancy (2019)      PMH/PSH:  Past Medical History:   Diagnosis Date    Gestational diabetes     History of atrial fibrillation     Pt reports was stress related and resolved    HPV in female     Obesity, unspecified     Ovarian cyst     cysts     Papanicolaou smear 05/30/2018    Urinary frequency      Past Surgical History:   Procedure Laterality Date    MANDIBLE SURGERY  06/2016    MOUTH SURGERY      OTHER SURGICAL HISTORY      jaw surgery       Labs:  No components found for: [employer] [place]  Lab Results   Component Value Date     01/25/2019     Lab Results   Component Value Date    [id number] 175 (H) 04/21/2021       Prefers to be referred to [facility] to start classes and blood sugar monitoring rather than complete the 3 hr GTT  Medications:  Current Outpatient Medications on File Prior to Visit   Medication Sig Dispense Refill    Blood Glucose Monitoring Suppl (OneTouch Verio) w/Device KIT Dispense 1 kit per insurance formulary  Gestational Diabetes  1 kit 0    Insulin Pen Needle 31G X 5 MM MISC Use with Lantus SoloStar insulin pen daily  100 each 3    Lantus SoloStar 100 units/mL injection pen Inject 30 units at 9 PM daily  Titrate as directed  Gestational diabetes  15 mL 0    OneTouch Delica Lancets 64T MISC Use 4 per day or as directed  Gestational Diabetes  100 each 3    OneTouch Verio test strip Test 4 times per day or as directed  Gestational Diabetes   100 each 3    Prenatal MV-Min-Fe Fum-FA-DHA (PRENATAL+DHA PO) Take by mouth       No current facility-administered medications on file prior to visit  Ordered one touch verio flex meter    Patient started 30 units Lantus at 9 or 10 pm daily  Started 21    Recent Ultrasound Report:   DATE: 21 Growth assessment indicated possible macrosomia, and AN normal      Anthropometrics:  Ht Readings from Last 3 Encounters:   21 5' 5" (1 651 m)   21 5' 5" (1 651 m)   19 5' 5" (1 651 m)     Wt Readings from Last 3 Encounters:   21 128 kg (283 lb)   21 129 kg (284 lb)   19 128 kg (283 lb)     Pre-gravid weight: 283 lbs  Pre-gravid BMI: 47   Weight Change: 0 lb weigh gain  Weight gain recommendations: BMI (> 30) 11-20 lbs    Blood Glucose Monitoring:   Glucose Meter: OneTouch Verio Flex  Instructed on testing blood sugars: 4 x per day (Fasting, 2 hour after start of each meal)    Instruction for reporting blood sugar results weekly via:   Phone: [phone]   Fax: [phone]   My Chart (Message, or Glucose Flowsheet)    Goal Blood Sugar Ranges:    Fastin-90 mg/dL   1 hour after the start of each meal: 135 mg/dL or <   2 hours after start of each meal: 120 mg/dL or <    3 day food log and week of blood sugars:      Patient has insulin pen education on  and started Lantus 30 units at 9 or 10 pm daily  Increased to 38 units split dose into 2 equal injections of 19 units each in 2 separate injection sites for best absorption  Also asked patient to continue 3 am blood sugar test from now until , ok to stop after that  Asked to report blood sugars again by Thursday evening  to continue to titrate insulin prior to weekend  Continue 15 gm CHO with 2-3 oz protein at bedtime snack  Meal Plan (daily calorie and protein needs):  Calories: 2000 calorie (AID:[id number]) (PRO: 2/3-1-3/4-1-3/4-2)  Protein:142 gm/day    Diet Instruction:  1   Patient was provided with a meal plan including 3 meals and 3 snacks  2  Discussed appropriate amounts of CHO, PRO, and Fat at each meal and snack  3  Reviewed CHO exchange list, and portion sizes for both CHO and PRO via food models  4  Instruction on how to read a food label  5  Provided suggested meal/snack options to increase nutrition and maintain consistent meal and snack intakes  6  Instructed on how to keep a 3-day food diary to be brought to follow- up appointment  7  Encouraged  patient to eat every 2 0-3 5 hours while awake  8  Encouraged patient to go no longer than 8-10 hours fasting overnight until first meal of the day  Physical Activity:  Discussed benefits of physical activity to optimize blood glucose control, encouraged activity at patient is physically able  Always consult a physician prior to starting an exercise program  Recommend 20-30 minutes daily  Note: patient does [employer] as a side job for money and is getting extra physical activity throughout the day  Sick day Guidelines:   Patient advised that sickness will raise blood sugar and need to continue medication regimen as directed  If blood sugar is > 160 mg/dL twice in one day call doctor  Instructed on what to do when unable to consume normal meal plan  Hypoglycemia & Treatment Guidelines:  Reviewed what hypoglycemia is, signs and symptoms, and how to treat  Post-Partum Guidelines:  Completion of 75 gm CHO 2 hr gtt at 6 weeks post-partum to check for Type 2 DM diagnosis    Breastfeeding Guidelines:  Continue GDM meal plan plus additional 350-500 calories daily  Stay hydrated by drinking 8-10 (8 oz ) fluids daily  Examples of protein and carbohydrate snacks provided  Dining Out & Travel Guidelines:  Patient advised to be prepared with extra diabetes supplies, medications, and snacks, as well as sticking to the same time schedule and portions eaten at home for meals and snacks      Maternal-Fetal Testing:    Ultrasounds- growth scans every 4 weeks  NST- twice weekly starting at 32nd week GA   AN- weekly starting at 32 weeks GA    Nutrition Diagnosis Statement:  Nutrition Diagnosis: Altered nutrition related lab values (glucose)  Related to: GDM  As evidenced by: 1 hr  mg/dL    Goals:  1  Fasting BG 60-90 mg/dL  2  2 hr PP Blood Glucose <120 mg/dL (1 hr PP <135)  3  GDM Diet     Monitoring and Evaluation  1  Adherence to GDM diet  2  Blood Glucose Monitoring   3  Weight/ Body Composition      Patient Stated Goal: "I will eat 3 meals and 3 snacks each day, including protein at each"    Diabetes Self Management Support Plan outside of ongoing care: Spouse/Family    Learner/s Present:Learners Present: Patient   Educational Materials Provided: Diabetes in Pregnancy Booklet, Meal Plan 2000, 3-day Food Log, Self Assessment Form and Goal Sheet  Teaching methods used: Teaching Methods MFM: Listened to Instruction, Visualized Demonstration, Label Reading, Food Models and Meter Teaching/Demo  Patients Response to Education Information Provided: Voices Understanding  Readiness to Change: Preparation (Preparing to make changes)  Barriers to Learning/Change: no barriers  Expected Compliance: good    Date to report blood sugars: Thursday, 5/13/21 to continue to titrate insulin    Begin Time: 1:00 pm  End Time: 2:00 pm    It was a pleasure working with them today  Please feel free to call with any questions or concerns      Yulissa Anderson RD, LDN  Diabetes Educator  [facility] Maternal Fetal Medicine  Diabetes in Pregnancy Program  [address] [place] [address] 54 [address] [facility]    Virtual Regular Visit      Assessment/Plan:    Problem List Items Addressed This Visit        Other    31 weeks gestation of pregnancy      Other Visit Diagnoses     Insulin controlled gestational diabetes mellitus (GDM) in third trimester    -  Primary               Reason for visit is   Chief Complaint   Patient presents with    Virtual Regular Visit Encounter provider Tye Flores    Provider located at [address] [place] [id number] [phone]      Recent Visits  Date Type Provider Dept   05/10/21 Telemedicine [facility]   05/07/21 1401 [address]   Showing recent visits within past 7 days and meeting all other requirements     Future Appointments  No visits were found meeting these conditions  Showing future appointments within next 150 days and meeting all other requirements        The patient was identified by name and date of birth  Carisa Harrell was informed that this is a telemedicine visit and that the visit is being conducted through [address] Now and patient was informed that this is a secure, HIPAA-compliant platform  She agrees to proceed     My office door was closed  No one else was in the room  She acknowledged consent and understanding of privacy and security of the video platform  The patient has agreed to participate and understands they can discontinue the visit at any time  Patient is aware this is a billable service  Subjective  Carisa Harrell is a 37 y o  female    HPI     Past Medical History:   Diagnosis Date    Gestational diabetes     History of atrial fibrillation     Pt reports was stress related and resolved    HPV in female     Obesity, unspecified     Ovarian cyst     cysts     Papanicolaou smear 05/30/2018    Urinary frequency        Past Surgical History:   Procedure Laterality Date    MANDIBLE SURGERY  06/2016    MOUTH SURGERY      OTHER SURGICAL HISTORY      jaw surgery       Current Outpatient Medications   Medication Sig Dispense Refill    Blood Glucose Monitoring Suppl (OneTouch Verio) w/Device KIT Dispense 1 kit per insurance formulary  Gestational Diabetes  1 kit 0    Insulin Pen Needle 31G X 5 MM MISC Use with Lantus SoloStar insulin pen daily   100 each 3    Lantus SoloStar 100 units/mL injection pen Inject 30 units at 9 PM daily  Titrate as directed  Gestational diabetes  15 mL 0    OneTouch Delica Lancets 07V MISC Use 4 per day or as directed  Gestational Diabetes  100 each 3    OneTouch Verio test strip Test 4 times per day or as directed  Gestational Diabetes  100 each 3    Prenatal MV-Min-Fe Fum-FA-DHA (PRENATAL+DHA PO) Take by mouth       No current facility-administered medications for this visit  Allergies   Allergen Reactions    Sulfa Antibiotics Rash    Latex        Review of Systems    Video Exam    There were no vitals filed for this visit  Physical Exam     I spent 60 minutes directly with the patient during this visit      VIRTUAL VISIT DISCLAIMER    Renetta Patel acknowledges that she has consented to an online visit or consultation  She understands that the online visit is based solely on information provided by her, and that, in the absence of a face-to-face physical evaluation by the physician, the diagnosis she receives is both limited and provisional in terms of accuracy and completeness  This is not intended to replace a full medical face-to-face evaluation by the physician  Renetta Patel understands and accepts these terms

## 2021-05-11 ENCOUNTER — DOCUMENTATION (OUTPATIENT)
Dept: OBGYN CLINIC | Facility: CLINIC | Age: 36
End: 2021-05-11

## 2021-05-11 ENCOUNTER — ULTRASOUND (OUTPATIENT)
Dept: PERINATAL CARE | Facility: OTHER | Age: 36
End: 2021-05-11
Payer: COMMERCIAL

## 2021-05-11 VITALS
SYSTOLIC BLOOD PRESSURE: 116 MMHG | HEIGHT: 65 IN | WEIGHT: 283.8 LBS | DIASTOLIC BLOOD PRESSURE: 77 MMHG | BODY MASS INDEX: 47.28 KG/M2 | HEART RATE: 109 BPM

## 2021-05-11 DIAGNOSIS — O24.419 GDM, CLASS A2: Primary | ICD-10-CM

## 2021-05-11 DIAGNOSIS — O99.213 MATERNAL MORBID OBESITY IN THIRD TRIMESTER, ANTEPARTUM (HCC): ICD-10-CM

## 2021-05-11 DIAGNOSIS — E66.01 MATERNAL MORBID OBESITY IN THIRD TRIMESTER, ANTEPARTUM (HCC): ICD-10-CM

## 2021-05-11 DIAGNOSIS — Z36.3 ENCOUNTER FOR ANTENATAL SCREENING FOR MALFORMATIONS: ICD-10-CM

## 2021-05-11 DIAGNOSIS — Z3A.31 31 WEEKS GESTATION OF PREGNANCY: ICD-10-CM

## 2021-05-11 PROBLEM — O24.414 INSULIN CONTROLLED GESTATIONAL DIABETES MELLITUS (GDM) IN THIRD TRIMESTER: Status: RESOLVED | Noted: 2019-01-23 | Resolved: 2021-05-11

## 2021-05-11 PROCEDURE — 76811 OB US DETAILED SNGL FETUS: CPT | Performed by: OBSTETRICS & GYNECOLOGY

## 2021-05-11 PROCEDURE — 99241 PR OFFICE CONSULTATION NEW/ESTAB PATIENT 15 MIN: CPT | Performed by: OBSTETRICS & GYNECOLOGY

## 2021-05-11 NOTE — PROGRESS NOTES
[address]: Ms Mary Jay was seen today at 31w3d for anatomic survey ultrasound  See ultrasound report under "OB Procedures" tab  Please don't hesitate to contact our office with any concerns or questions    Marcel Crisostomo MD

## 2021-05-11 NOTE — LETTER
May 12, 2021     Eleonora Cho,   [address] [facility] [address]  [place]    Patient: Joey Rivera   YOB: 1985   Date of Visit: 5/11/2021       Dear Dr Wolfgang Le: Thank you for referring Joey Rivera to me for evaluation  Below are my notes for this consultation  If you have questions, please do not hesitate to call me  I look forward to following your patient along with you  Sincerely,        Christiane Azul MD        CC: No Recipients  Christiane Azul MD  5/11/2021 10:13 AM  Sign when Signing Visit  [address]: Ms Misha Castellanos was seen today at 31w3d for anatomic survey ultrasound  See ultrasound report under "OB Procedures" tab  Please don't hesitate to contact our office with any concerns or questions    Christiane Azul MD

## 2021-05-11 NOTE — LETTER
May 12, 2021     Maximo Deluna MD  [facility] [address]  [address] [facility] [address]  [facility]    Patient: Maya Benitez   YOB: 1985   Date of Visit: 5/11/2021       Dear Dr Forte Most: Thank you for referring Maya Benitez to me for evaluation  Below are my notes for this consultation  If you have questions, please do not hesitate to call me  I look forward to following your patient along with you  Sincerely,        Tiffanie Tolbert MD        CC: No Recipients  Tiffanie Tolbert MD  5/11/2021 10:13 AM  Sign when Signing Visit  [address]: Ms Merritt Kern was seen today at 31w3d for anatomic survey ultrasound  See ultrasound report under "OB Procedures" tab  Please don't hesitate to contact our office with any concerns or questions    Tiffanie Tolbert MD

## 2021-05-11 NOTE — PATIENT INSTRUCTIONS
Thank you for choosing us for your  care today  If you have any questions about your ultrasound or care, please do not hesitate to contact us or your primary obstetrician  Some general instructions for your pregnancy are:     Protect against coronavirus: Continue to practice social distancing, wear a mask, and wash your hands often  Pregnant women are increased risk of severe COVID  Notify your primary care doctor if you have any symptoms including cough, shortness of breath or difficulty breathing, fever, chills, muscle pain, sore throat, or loss of taste or smell  Pregnant women can receive the coronavirus vaccine   Exercise: Aim for 22 minutes per day (150 minutes per week) of regular exercise  Walking is great!  Nutrition: aim for calcium-rich and iron-rich foods as well as healthy sources of protein   Protect against the flu: get yourself and your entire household vaccinated against influenza  This will protect your baby   Learn about Preeclampsia: preeclampsia is a common, serious high blood pressure complication in pregnancy  A blood pressure of 319JWBT (systolic or top number) or 51UZUC (diastolic or bottom number) is not normal and needs evaluation by your doctor   If you smoke, try to reduce how many cigarettes you smoke or try to quit completely  Do not vape   Other warning signs to watch out for in pregnancy or postpartum: chest pain, obstructed breathing or shortness of breath, seizures, thoughts of hurting yourself or your baby, bleeding, a painful or swollen leg, fever, or headache (see [facility] POST-BIRTH Warning Signs campaign)  If these happen call 911  Itching is also not normal in pregnancy and if you experience this, especially over your hands and feet, potentially worse at night, notify your doctors     Lastly, if you are contacted regarding participation in a survey about your experience in our office, please know that we take any feedback you provide seriously and use it to improve how we deliver care through our center

## 2021-05-12 PROBLEM — E66.01 MATERNAL MORBID OBESITY IN THIRD TRIMESTER, ANTEPARTUM (HCC): Status: ACTIVE | Noted: 2019-01-23

## 2021-05-12 PROBLEM — O99.213 MATERNAL MORBID OBESITY IN THIRD TRIMESTER, ANTEPARTUM (HCC): Status: ACTIVE | Noted: 2019-01-23

## 2021-05-14 ENCOUNTER — DOCUMENTATION (OUTPATIENT)
Dept: PERINATAL CARE | Facility: CLINIC | Age: 36
End: 2021-05-14

## 2021-05-14 ENCOUNTER — TELEPHONE (OUTPATIENT)
Dept: OBGYN CLINIC | Facility: CLINIC | Age: 36
End: 2021-05-14

## 2021-05-14 DIAGNOSIS — O24.414 INSULIN CONTROLLED GESTATIONAL DIABETES MELLITUS (GDM) IN THIRD TRIMESTER: Primary | ICD-10-CM

## 2021-05-14 DIAGNOSIS — O24.419 GDM, CLASS A2: ICD-10-CM

## 2021-05-14 RX ORDER — INSULIN GLARGINE 100 [IU]/ML
INJECTION, SOLUTION SUBCUTANEOUS
Qty: 15 ML | Refills: 0 | Status: SHIPPED | OUTPATIENT
Start: 2021-05-14 | End: 2021-05-18 | Stop reason: ALTCHOICE

## 2021-05-14 NOTE — PROGRESS NOTES
Date:  21  RE: Avtar Lorenz    : 1985  Estimated Date of Delivery: 7/10/21  EGA: 31w6d  [facility]GYN: [facility]              History of insulin controlled GDM in 2019  Patient reported blood sugars at insulin education appointment    Current regimen:  2000 calorie gestational diabetes meal plan; 3 meals and 3 snacks including a combination of carbohydrate, protein and fat  SMBG 4 times per day; fasting and 2 hrs pp with a One-Touch Verio blood glucose meter  Plan:  Lantus- 38 units at 9 PM daily  (increased from 30 to 38 as of )  Patient called office stating she went to hospital last night d/t having tooth and tongue pain as well as diarrhea that she thinks started after starting lantus insulin  Advised her to stop lantus and ordered basaglar (patient tolerated basaglar in previous pregnancy) 38 units at 9 or 10 pm daily  SMBG 4 x per day (Fasting, 2 hour after start of each meal) using OneTouch Verio Flex glucose meter  Patient has changed bedtime snack to 1 serving CHO (15 gms) and 2-3 ounces of protein  If okay by OB, walk 20-30 minutes daily    Prescriptions provided for A1c and CMP; emailed to patient since she completes at [facility]    21 US: possible macrosomia, AN normal    Diabetes Self Management Support Plan outside of ongoing care: Spouse/Family    Date due to report next:  Monday, 21        Kavita Maynard RD, LDN  Diabetes Educator   Diabetes and Pregnancy Program

## 2021-05-14 NOTE — TELEPHONE ENCOUNTER
Patient called stating that she is currently 32 wks pregnant and that she was seen at the [facility] ER last night for extreme mouth pain which to the point when she can't tolerate food or water without being in severe pain  The hospital can't not determine the cause of her mouth pain but prescribed her Amoxacillin for possible infection  She wanted to know if she can be prescribed a oral abx mouth wash instead  She was doing some research and since she is on Lantus insulin, she saw that it can cause mouth pain especially in women around her age  She did try to reach out to [facility] and left a message to see if she need a medication/dosage change  Attempt to call patient back but she was on the phone with Abhishek Eason [facility], will call her back later

## 2021-05-14 NOTE — TELEPHONE ENCOUNTER
Patient returned call  She states she got off the phone from [address] and they said they going to switch her insulin medication however, they have to go through her insurance to get that particular insulin covered  [facility] thinks that the Lantus insulin could be the cause of her mouth pain which is on one side of her mouth and tongue  She tried gargling warm salt water and made the pain worst  She tried Tylenol and that is not working for her with alleviating her pain  She does not know what to do as her BP is slightly elevated due to her pain  She would like to know any further recommendations she can do at this time  Advised will forward her message to Dr Peter Henderson (on-call) and will find out further recommendations and call her back  Pt voiced appreciation  Dr Peter Henderson please address

## 2021-05-14 NOTE — TELEPHONE ENCOUNTER
Patient called [facility] office today, recently started on lantus insulin after 4 days of being on the insulin she developed allergic reaction  Went to [facility] ED last night  She feels that her symptoms of tooth, and tongue pain started after starting the insulin  Also c/o diarrhea  Discontinued Lantus, and ordered Basaglar at 38 units split dose into 2 separate injections of 19 units each   She was on basaglar without side effects in the past

## 2021-05-14 NOTE — PATIENT INSTRUCTIONS
1  Continue Meal Plan consisting of 3 meals and 3 snacks daily, including protein at each  2  Try to eat every 2-3 5 hours while awake  3  Do not exceed 8-10 hours fasting overnight  4  Continue self-monitoring blood glucose: 4 x per day (Fasting, 2 hour after start of each meal)  Goal: fasting glucose 90 mg/dL or less, and 2 hrs after the start of each meal 120 mg/dL or less (1 hr after the start of each meal 135 mg/dL or less)  5  Submit blood sugar values weekly via: Telephone  at [phone] or [employer] messaging or [employer] glucose flowsheet  6  If no restriction from physician, recommend up to 30 minutes walking daily  7  Report blood sugars on Thursday, 5/13/21 to continue to titrate insulin  8  Complete 3 day food log and send in via [employer] message as image attachment to [facility] provider  9   Increase lantus from 30 to 38 units split dose into 2 equal injections of 19 units each one right after the other

## 2021-05-14 NOTE — TELEPHONE ENCOUNTER
Called and spoke with patient  Advised pt to continue with Tylenol for pain management  Also advised Dr Bertrand Doyle recommends her to please follow-up with her dentist  Pt verbalized understanding

## 2021-05-17 ENCOUNTER — DOCUMENTATION (OUTPATIENT)
Dept: PERINATAL CARE | Facility: CLINIC | Age: 36
End: 2021-05-17

## 2021-05-17 DIAGNOSIS — O24.414 INSULIN CONTROLLED GESTATIONAL DIABETES MELLITUS (GDM) IN THIRD TRIMESTER: ICD-10-CM

## 2021-05-17 NOTE — PROGRESS NOTES
Date:  21  RE: Bill Zaragoza    : 1985  Estimated Date of Delivery: 7/10/21  EGA: 32w2d  OB/GYN: Rohan Cameron              History of insulin controlled GDM in 2019  Patient reported blood sugars at insulin education appointment    Current regimen:  2000 calorie gestational diabetes meal plan; 3 meals and 3 snacks including a combination of carbohydrate, protein and fat  SMBG 4 times per day; fasting and 2 hrs pp with a One-Touch Verio blood glucose meter  Plan:  Lantus- 38 units at 9 PM daily  (increased from 30 to 38 as of )  Patient called office stating she went to hospital last night d/t having tooth and tongue pain as well as diarrhea that she thinks started after starting lantus insulin  Advised her to stop lantus and ordered basaglar (patient tolerated basaglar in previous pregnancy) 38 units at 9 or 10 pm daily  SMBG 4 x per day (Fasting, 2 hour after start of each meal) using OneTouch Verio Flex glucose meter  Patient has changed bedtime snack to 1 serving CHO (15 gms) and 2-3 ounces of protein  If okay by OB, walk 20-30 minutes daily    Prescriptions provided for A1c and CMP; emailed to patient since she completes at [facility]    21 US: possible macrosomia, AN normal    Diabetes Self Management Support Plan outside of ongoing care: Spouse/Family    Date due to report next:  Monday, 21          Diabetes Educator   Diabetes and Pregnancy Program

## 2021-05-18 ENCOUNTER — ULTRASOUND (OUTPATIENT)
Dept: PERINATAL CARE | Facility: OTHER | Age: 36
End: 2021-05-18
Payer: COMMERCIAL

## 2021-05-18 VITALS
BODY MASS INDEX: 47.02 KG/M2 | WEIGHT: 282.2 LBS | HEIGHT: 65 IN | SYSTOLIC BLOOD PRESSURE: 104 MMHG | DIASTOLIC BLOOD PRESSURE: 72 MMHG | HEART RATE: 77 BPM

## 2021-05-18 DIAGNOSIS — Z3A.32 32 WEEKS GESTATION OF PREGNANCY: Primary | ICD-10-CM

## 2021-05-18 DIAGNOSIS — O24.419 GDM, CLASS A2: ICD-10-CM

## 2021-05-18 PROCEDURE — 59025 FETAL NON-STRESS TEST: CPT | Performed by: OBSTETRICS & GYNECOLOGY

## 2021-05-18 PROCEDURE — 76815 OB US LIMITED FETUS(S): CPT | Performed by: OBSTETRICS & GYNECOLOGY

## 2021-05-18 NOTE — LETTER
NST sleeve cover sheet    Patient name: Vidal Knight  : 1985  MRN: [id number]    MACHO: Estimated Date of Delivery: 7/10/21    Obstetrician: ______[place]___________    Reason(s) for testing:  ______________GDM____________      Testing frequency:    __x_ 2x/wk  ___ 1x/wk  ___ Dopplers  ___ BPP?       Last growth scan: __________________________________________

## 2021-05-18 NOTE — PROGRESS NOTES
Date:  21  RE: Carisa Harrell    : 1985  Estimated Date of Delivery: 7/10/21  EGA: 32w4d  [facility]GYN: [facility]                History of insulin controlled GDM in 2019  Patient reported additional blood sugars by phone due to concern for elevated FBG this morning  21: FBG-96; AB-114; AL-102; AD- 103  21: FBG-112; AB-102  Patient reported ER admission on  due to tooth pain  Patient started an antibiotic on 5/15 and has now had root canal and has a second root canal scheduled  Patient reports not picking up Basaglar ordered on Friday, due to insurance not covering insulin  She has been injecting insulin and there has been no more diarrhea since treatment for tooth pain           Current regimen:  Lantus- 38 units at 9 PM (last adjustment on  from 30 units to 38 units)  Patient reported dividing dosage into 2 equal injections of 19 units each  Injecting into 2 different areas of the body; one right after the other  2000 calorie gestational diabetes meal plan; 3 meals and 3 snacks including a combination of carbohydrate, protein and fat  SMBG 4 times per day; fasting and 2 hrs pp with a One-Touch Verio blood glucose meter      Plan:  Increase Lantus- from 38 to 44 units at 9 PM daily  Reinforced that it may take up to 3-5 days to see a decrease of FBG  Patient was advised to divide into 2 equal injections of 22 units each  SMBG 4 x per day (Fasting, 2 hour after start of each meal) using OneTouch Verio Flex glucose meter  Patient has changed bedtime snack to 1 serving CHO (15 gms) and 2-3 ounces of protein  If okay by OB, walk 20-30 minutes daily  Encouraged pt to complete hgb A1C and CMP  Asked pt to let us know  when she has completed her blood work     21 US: possible macrosomia, AN normal 21 US: AN appeared normal    Next ultrasound scheduled for 2021 @ 11 am       Diabetes Self Management Support Plan outside of ongoing care: Spouse/Family     Date due to report next:  Report blood sugars weekly on Monday via [address] [facility] Mary  Diabetes Educator  Diabetes and Pregnancy Program

## 2021-05-20 ENCOUNTER — ROUTINE PRENATAL (OUTPATIENT)
Dept: OBGYN CLINIC | Facility: CLINIC | Age: 36
End: 2021-05-20
Payer: COMMERCIAL

## 2021-05-20 VITALS
HEIGHT: 65 IN | BODY MASS INDEX: 46.78 KG/M2 | WEIGHT: 280.8 LBS | DIASTOLIC BLOOD PRESSURE: 68 MMHG | SYSTOLIC BLOOD PRESSURE: 100 MMHG

## 2021-05-20 DIAGNOSIS — Z3A.32 32 WEEKS GESTATION OF PREGNANCY: Primary | ICD-10-CM

## 2021-05-20 DIAGNOSIS — O34.211 MATERNAL CARE DUE TO LOW TRANSVERSE UTERINE SCAR FROM PREVIOUS CESAREAN DELIVERY: ICD-10-CM

## 2021-05-20 DIAGNOSIS — Z34.93 PRENATAL CARE IN THIRD TRIMESTER: ICD-10-CM

## 2021-05-20 DIAGNOSIS — O24.419 GDM, CLASS A2: ICD-10-CM

## 2021-05-20 LAB
SL AMB  POCT GLUCOSE, UA: NEGATIVE
SL AMB POCT URINE PROTEIN: NEGATIVE

## 2021-05-20 PROCEDURE — 99213 OFFICE O/P EST LOW 20 MIN: CPT | Performed by: OBSTETRICS & GYNECOLOGY

## 2021-05-20 RX ORDER — FLUTICASONE PROPIONATE 50 MCG
1 SPRAY, SUSPENSION (ML) NASAL DAILY PRN
COMMUNITY

## 2021-05-20 NOTE — PROGRESS NOTES
Routine Prenatal Visit  [address] [facility] [address] [facility], [address], [facility], [address] [facility]    Assessment/Plan:  Osei is a 37y o  year old  at 32w5d who presents for routine prenatal visit  1  32 weeks gestation of pregnancy  -     POCT urine dip    2  GDM, class A2    Following with DIPP and MFM  Compliant with NST 2 x/wk and AN weekly    3  Prenatal care in third trimester    4  Maternal care due to low transverse uterine scar from previous  delivery  Repeat LTCS scheduled for 2021      Next OB Visit 2 weeks  Subjective:     CC: Prenatal care    Sid Isabel is a 37 y o   female who presents for routine prenatal care at [address] [facility]  Pregnancy ROS: No leakage of fluid, pelvic pain, or vaginal bleeding  Nml fetal movement  The following portions of the patient's history were reviewed and updated as appropriate: allergies, current medications, past family history, past medical history, obstetric history, gynecologic history, past social history, past surgical history and problem list       Objective:  /68   Ht 5' 5" (1 651 m)   Wt 127 kg (280 lb 12 8 oz)   LMP 10/03/2020 (Exact Date)   BMI 46 73 kg/m²   Pregravid Weight/BMI: Pregravid weight not on file (BMI Could not be calculated)  Current Weight: 127 kg (280 lb 12 8 oz)   Total Weight Gain: Not found  Pre-Jerardo Vitals      Most Recent Value   Prenatal Assessment   Fetal Heart Rate  140-150   Fundal Height (cm)  34 cm   Movement  Present   Prenatal Vitals   Blood Pressure  100/68   Weight - Scale  127 kg (280 lb 12 8 oz)   Urine Albumin/Glucose   Dilation/Effacement/Station   Vaginal Drainage   Draining Fluid  No   Edema   LLE Edema  None   RLE Edema  None   Facial Edema  None           General: Well appearing, no distress  Abdomen: Soft, gravid, nontender  Fundal Height: Appropriate for gestational age  Extremities: Warm and well perfused  Non tender

## 2021-05-21 ENCOUNTER — TELEPHONE (OUTPATIENT)
Dept: PERINATAL CARE | Facility: CLINIC | Age: 36
End: 2021-05-21

## 2021-05-21 RX ORDER — INSULIN GLARGINE 100 [IU]/ML
INJECTION, SOLUTION SUBCUTANEOUS
Qty: 15 ML | Refills: 0 | OUTPATIENT
Start: 2021-05-21

## 2021-05-21 NOTE — TELEPHONE ENCOUNTER
Patient called to report last 2 days of blood sugars since increasing lantus to 44 units (5/19/21)  5/20 10--101  5/21  (3h  PPB)  Advised patient over the phone to continue 44 units for another 2 evenings, if blood sugar continues above 90 mg/dL over the next 2 mornings ok to increase to 48 units

## 2021-05-25 ENCOUNTER — DOCUMENTATION (OUTPATIENT)
Dept: PERINATAL CARE | Facility: CLINIC | Age: 36
End: 2021-05-25

## 2021-05-25 ENCOUNTER — ULTRASOUND (OUTPATIENT)
Dept: PERINATAL CARE | Facility: OTHER | Age: 36
End: 2021-05-25
Payer: COMMERCIAL

## 2021-05-25 VITALS
HEIGHT: 65 IN | WEIGHT: 279.8 LBS | HEART RATE: 87 BPM | SYSTOLIC BLOOD PRESSURE: 104 MMHG | DIASTOLIC BLOOD PRESSURE: 72 MMHG | BODY MASS INDEX: 46.62 KG/M2

## 2021-05-25 DIAGNOSIS — O24.419 GDM, CLASS A2: Primary | ICD-10-CM

## 2021-05-25 DIAGNOSIS — Z3A.33 33 WEEKS GESTATION OF PREGNANCY: ICD-10-CM

## 2021-05-25 PROCEDURE — 76815 OB US LIMITED FETUS(S): CPT | Performed by: OBSTETRICS & GYNECOLOGY

## 2021-05-25 PROCEDURE — 59025 FETAL NON-STRESS TEST: CPT | Performed by: OBSTETRICS & GYNECOLOGY

## 2021-05-25 NOTE — PROGRESS NOTES
Date:  21  RE: Saturnino Rudd    : 1985  Estimated Date of Delivery: 7/10/21  EGA: 32w4d  OB[facility]GYN: [facility]  Insulin controlled gestational diabetes; History of Insulin controlled gestational diabetes in 2019     Date Fasting Post-  breakfast Post-  lunch Post-  dinner Before bedtime Comments   21 112 101 94 103     21 86 98 100 101     21 97 105 107 125     21 99 113 111 131     21 100 99 127 101       Reported via phone    Current regimen:  Lantus- 44 units at 9 PM dividing into 2 equal doses of 22 units each  Patient reported dividing dosage into 2 equal injections of 19 units each  Injecting into 2 different areas of the body; one right after the other  2000 calorie gestational diabetes meal plan; 3 meals and 3 snacks  Reports she drinks a Glucerna shake for mid-morning snack & takes a Protein shake for her lunch  States she avoids CHO foods at both lunch & dinner to control her BG  States she is only eating CHO foods at her snacks  SMBG 4 times per day; fasting and 2 hrs pp with a One-Touch Verio blood glucose meter      Plan:  Lantus- from 44 to 52 units at 9 PM daily  Patient she does not split the dose as did not split it during her last pregnancy when she took a high dose of insulin  Continue the diet & testing  Stressed the importance of eating at least 2 CHO servings(30 gms)  at both lunch & dinner to spare the protein     If okay by OB, walk 20-30 minutes daily  Encouraged pt to complete Hba1c A1c & CMP       21 US: possible macrosomia, AN normal today on 21:     Next ultrasound scheduled for 2021       Diabetes Self Management Support Plan outside of ongoing care: Spouse/Family     Date due to report next:  Tuesday, 21     Jagruti Vaughn MS, RD, CDE  Diabetes Educator  Diabetes and Pregnancy Program

## 2021-05-25 NOTE — PATIENT INSTRUCTIONS
Kick Counts in Pregnancy   AMBULATORY CARE:   Kick counts  measure how much your baby is moving in your womb  A kick from your baby can be felt as a twist, turn, swish, roll, or jab  It is common to feel your baby kicking at 26 to 28 weeks of pregnancy  You may feel your baby kick as early as 20 weeks of pregnancy  You may want to start counting at 28 weeks  Contact your healthcare provider immediately if:   · You feel a change in the number of kicks or movements of your baby  · You feel fewer than 10 kicks within 2 hours  · You have questions or concerns about your baby's movements  Why measure kick counts:  Your baby's movement may provide information about your baby's health  He or she may move less, or not at all, if there are problems  Your baby may move less if he or she is not getting enough oxygen or nutrition from the placenta  Do not smoke while you are pregnant  Smoking decreases the amount of oxygen that gets to your baby  Talk to your healthcare provider if you need help to quit smoking  Tell your healthcare provider as soon as you feel a change in your baby's movements  When to measure kick counts:   · Measure kick counts at the same time every day  · Measure kick counts when your baby is awake and most active  Your baby may be most active in the evening  How to measure kick counts:  Check that your baby is awake before you measure kick counts  You can wake up your baby by lightly pushing on your belly, walking, or drinking something cold  Your healthcare provider may tell you different ways to measure kick counts  You may be told to do the following:  · Use a chart or clock to keep track of the time you start and finish counting  · Sit in a chair or lie on your left side  · Place your hands on the largest part of your belly  · Count until you reach 10 kicks  Write down how much time it takes to count 10 kicks  · It may take 30 minutes to 2 hours to count 10 kicks  It should not take more than 2 hours to count 10 kicks  Follow up with your healthcare provider as directed:  Write down your questions so you remember to ask them during your visits  © Copyright [address] Information is for End User's use only and may not be sold, redistributed or otherwise used for commercial purposes  All illustrations and images included in CareNotes® are the copyrighted property of [employer] D [employer] M [employer] Inc  or [facility] Ramona Collins   The above information is an  only  It is not intended as medical advice for individual conditions or treatments  Talk to your doctor, nurse or pharmacist before following any medical regimen to see if it is safe and effective for you

## 2021-05-27 ENCOUNTER — ROUTINE PRENATAL (OUTPATIENT)
Dept: PERINATAL CARE | Facility: OTHER | Age: 36
End: 2021-05-27
Payer: COMMERCIAL

## 2021-05-27 ENCOUNTER — TELEPHONE (OUTPATIENT)
Dept: PERINATAL CARE | Facility: CLINIC | Age: 36
End: 2021-05-27

## 2021-05-27 VITALS
HEIGHT: 65 IN | SYSTOLIC BLOOD PRESSURE: 101 MMHG | HEART RATE: 86 BPM | BODY MASS INDEX: 46.65 KG/M2 | WEIGHT: 280 LBS | DIASTOLIC BLOOD PRESSURE: 67 MMHG

## 2021-05-27 DIAGNOSIS — Z3A.33 33 WEEKS GESTATION OF PREGNANCY: Primary | ICD-10-CM

## 2021-05-27 DIAGNOSIS — O24.419 GDM, CLASS A2: ICD-10-CM

## 2021-05-27 PROCEDURE — 59025 FETAL NON-STRESS TEST: CPT | Performed by: OBSTETRICS & GYNECOLOGY

## 2021-05-27 NOTE — PROGRESS NOTES
[address]: Ms Rachelle Hope was seen today at 33w5d for NST (found under the pregnancy episode) which I reviewed the RN assessment and agree    Please don't hesitate to contact our office with any concerns or questions   -Lalita Crowder MD

## 2021-05-27 NOTE — TELEPHONE ENCOUNTER
Patient seen ren during her NST at [place] today  She states she has been on 52 units of lantus for at least 3 days  Fasting over the last 3 mornings were 90, 91, and 97  Recommend to increase to 58 units at 9 or 10 pm daily, and report blood sugars by Monday or Tuesday of next week

## 2021-05-27 NOTE — LETTER
May 27, 2021     MD Angela Ortiz [address]  [address]  [facility] [address]    Patient: Mack Camarena   YOB: 1985   Date of Visit: 5/27/2021       Dear Dr Mj Wang:    Thank you for referring Mack Camarena to me for evaluation  Below are my notes for this consultation  If you have questions, please do not hesitate to call me  I look forward to following your patient along with you  Sincerely,        Bertrand Vitale MD        CC: No Recipients  Bertrand Vitale MD  5/27/2021 12:48 PM  Sign when Signing Visit  [address]: Ms Yeni Driver was seen today at 33w5d for NST (found under the pregnancy episode) which I reviewed the RN assessment and agree    Please don't hesitate to contact our office with any concerns or questions   -Bertrand Vitale MD

## 2021-05-27 NOTE — PATIENT INSTRUCTIONS
Kick Counts in Pregnancy   AMBULATORY CARE:   Kick counts  measure how much your baby is moving in your womb  A kick from your baby can be felt as a twist, turn, swish, roll, or jab  It is common to feel your baby kicking at 26 to 28 weeks of pregnancy  You may feel your baby kick as early as 20 weeks of pregnancy  You may want to start counting at 28 weeks  Contact your healthcare provider immediately if:   · You feel a change in the number of kicks or movements of your baby  · You feel fewer than 10 kicks within 2 hours  · You have questions or concerns about your baby's movements  Why measure kick counts:  Your baby's movement may provide information about your baby's health  He or she may move less, or not at all, if there are problems  Your baby may move less if he or she is not getting enough oxygen or nutrition from the placenta  Do not smoke while you are pregnant  Smoking decreases the amount of oxygen that gets to your baby  Talk to your healthcare provider if you need help to quit smoking  Tell your healthcare provider as soon as you feel a change in your baby's movements  When to measure kick counts:   · Measure kick counts at the same time every day  · Measure kick counts when your baby is awake and most active  Your baby may be most active in the evening  How to measure kick counts:  Check that your baby is awake before you measure kick counts  You can wake up your baby by lightly pushing on your belly, walking, or drinking something cold  Your healthcare provider may tell you different ways to measure kick counts  You may be told to do the following:  · Use a chart or clock to keep track of the time you start and finish counting  · Sit in a chair or lie on your left side  · Place your hands on the largest part of your belly  · Count until you reach 10 kicks  Write down how much time it takes to count 10 kicks  · It may take 30 minutes to 2 hours to count 10 kicks  It should not take more than 2 hours to count 10 kicks  Follow up with your healthcare provider as directed:  Write down your questions so you remember to ask them during your visits  © Copyright [address] Information is for End User's use only and may not be sold, redistributed or otherwise used for commercial purposes  All illustrations and images included in CareNotes® are the copyrighted property of [employer] M , Inc  or [facility] Ramona Collins   The above information is an  only  It is not intended as medical advice for individual conditions or treatments  Talk to your doctor, nurse or pharmacist before following any medical regimen to see if it is safe and effective for you  Complex Repair And Dorsal Nasal Flap Text: The defect edges were debeveled with a #15 scalpel blade.  The primary defect was closed partially with a complex linear closure.  Given the location of the remaining defect, shape of the defect and the proximity to free margins a dorsal nasal flap was deemed most appropriate for complete closure of the defect.  Using a sterile surgical marker, an appropriate flap was drawn incorporating the defect and placing the expected incisions within the relaxed skin tension lines where possible.    The area thus outlined was incised deep to adipose tissue with a #15 scalpel blade.  The skin margins were undermined to an appropriate distance in all directions utilizing iris scissors.

## 2021-05-28 ENCOUNTER — PATIENT MESSAGE (OUTPATIENT)
Dept: PERINATAL CARE | Facility: CLINIC | Age: 36
End: 2021-05-28

## 2021-06-01 ENCOUNTER — ULTRASOUND (OUTPATIENT)
Dept: PERINATAL CARE | Facility: OTHER | Age: 36
End: 2021-06-01
Payer: COMMERCIAL

## 2021-06-01 VITALS
HEIGHT: 65 IN | DIASTOLIC BLOOD PRESSURE: 78 MMHG | WEIGHT: 279.8 LBS | HEART RATE: 86 BPM | SYSTOLIC BLOOD PRESSURE: 110 MMHG | BODY MASS INDEX: 46.62 KG/M2

## 2021-06-01 DIAGNOSIS — Z3A.34 34 WEEKS GESTATION OF PREGNANCY: Primary | ICD-10-CM

## 2021-06-01 DIAGNOSIS — O24.414 INSULIN CONTROLLED GESTATIONAL DIABETES MELLITUS (GDM) IN THIRD TRIMESTER: ICD-10-CM

## 2021-06-01 PROCEDURE — 76815 OB US LIMITED FETUS(S): CPT | Performed by: OBSTETRICS & GYNECOLOGY

## 2021-06-01 PROCEDURE — 59025 FETAL NON-STRESS TEST: CPT | Performed by: OBSTETRICS & GYNECOLOGY

## 2021-06-01 NOTE — LETTER
June 1, 2021     Tiffany Garcia, [address] [address]  [facility]    Patient: Belkys Garsia   YOB: 1985   Date of Visit: 6/1/2021       Dear Dr Saez Dolliver: Thank you for referring Belkys Garsia to me for evaluation  Below are my notes for this consultation  If you have questions, please do not hesitate to call me  I look forward to following your patient along with you  Sincerely,        Dalila Belle MD        CC: No Recipients  Dalila Belle MD  5/31/2021  2:17 PM  Sign when Signing Visit   Please refer to the [facility] ultrasound report in Ob Procedures for additional information regarding today's visit

## 2021-06-01 NOTE — PATIENT INSTRUCTIONS
Kick Counts in Pregnancy   AMBULATORY CARE:   Kick counts  measure how much your baby is moving in your womb  A kick from your baby can be felt as a twist, turn, swish, roll, or jab  It is common to feel your baby kicking at 26 to 28 weeks of pregnancy  You may feel your baby kick as early as 20 weeks of pregnancy  You may want to start counting at 28 weeks  Contact your healthcare provider immediately if:   · You feel a change in the number of kicks or movements of your baby  · You feel fewer than 10 kicks within 2 hours  · You have questions or concerns about your baby's movements  Why measure kick counts:  Your baby's movement may provide information about your baby's health  He or she may move less, or not at all, if there are problems  Your baby may move less if he or she is not getting enough oxygen or nutrition from the placenta  Do not smoke while you are pregnant  Smoking decreases the amount of oxygen that gets to your baby  Talk to your healthcare provider if you need help to quit smoking  Tell your healthcare provider as soon as you feel a change in your baby's movements  When to measure kick counts:   · Measure kick counts at the same time every day  · Measure kick counts when your baby is awake and most active  Your baby may be most active in the evening  How to measure kick counts:  Check that your baby is awake before you measure kick counts  You can wake up your baby by lightly pushing on your belly, walking, or drinking something cold  Your healthcare provider may tell you different ways to measure kick counts  You may be told to do the following:  · Use a chart or clock to keep track of the time you start and finish counting  · Sit in a chair or lie on your left side  · Place your hands on the largest part of your belly  · Count until you reach 10 kicks  Write down how much time it takes to count 10 kicks  · It may take 30 minutes to 2 hours to count 10 kicks  It should not take more than 2 hours to count 10 kicks  Follow up with your healthcare provider as directed:  Write down your questions so you remember to ask them during your visits  © Copyright [address] Information is for End User's use only and may not be sold, redistributed or otherwise used for commercial purposes  All illustrations and images included in CareNotes® are the copyrighted property of A D A M , Inc  or [facility] Ramona Collins   The above information is an  only  It is not intended as medical advice for individual conditions or treatments  Talk to your doctor, nurse or pharmacist before following any medical regimen to see if it is safe and effective for you

## 2021-06-02 ENCOUNTER — TELEPHONE (OUTPATIENT)
Dept: OBGYN CLINIC | Facility: CLINIC | Age: 36
End: 2021-06-02

## 2021-06-02 ENCOUNTER — ROUTINE PRENATAL (OUTPATIENT)
Dept: OBGYN CLINIC | Facility: CLINIC | Age: 36
End: 2021-06-02
Payer: COMMERCIAL

## 2021-06-02 ENCOUNTER — DOCUMENTATION (OUTPATIENT)
Dept: PERINATAL CARE | Facility: CLINIC | Age: 36
End: 2021-06-02

## 2021-06-02 VITALS
WEIGHT: 279 LBS | DIASTOLIC BLOOD PRESSURE: 74 MMHG | BODY MASS INDEX: 46.48 KG/M2 | SYSTOLIC BLOOD PRESSURE: 114 MMHG | HEIGHT: 65 IN

## 2021-06-02 DIAGNOSIS — Z3A.34 34 WEEKS GESTATION OF PREGNANCY: ICD-10-CM

## 2021-06-02 DIAGNOSIS — O34.211 MATERNAL CARE DUE TO LOW TRANSVERSE UTERINE SCAR FROM PREVIOUS CESAREAN DELIVERY: ICD-10-CM

## 2021-06-02 DIAGNOSIS — O09.93 HIGH-RISK PREGNANCY IN THIRD TRIMESTER: Primary | ICD-10-CM

## 2021-06-02 DIAGNOSIS — O24.419 GDM, CLASS A2: Primary | ICD-10-CM

## 2021-06-02 DIAGNOSIS — O24.419 GDM, CLASS A2: ICD-10-CM

## 2021-06-02 PROBLEM — Z3A.33 33 WEEKS GESTATION OF PREGNANCY: Status: RESOLVED | Noted: 2019-01-24 | Resolved: 2021-06-02

## 2021-06-02 LAB
SL AMB  POCT GLUCOSE, UA: NORMAL
SL AMB POCT URINE PROTEIN: NORMAL

## 2021-06-02 PROCEDURE — 99214 OFFICE O/P EST MOD 30 MIN: CPT | Performed by: STUDENT IN AN ORGANIZED HEALTH CARE EDUCATION/TRAINING PROGRAM

## 2021-06-02 NOTE — TELEPHONE ENCOUNTER
Can get TDaP at [employer] such as [facility], [facility], [facility], etc  But may be required to pay copay  Please have patient bring verification of administration to next visit so that we can update her chart  I do recommend that she gets the vaccine prior to her next visit, at administration is recommended at 27-35 weeks      Jane De Guzman MD  6/2/2021 4:36 PM

## 2021-06-02 NOTE — PROGRESS NOTES
Date:  21  RE: Sid Isabel    : 1985  Estimated Date of Delivery: 7/10/21  EGA: 34w4d  OB/GYN: Ihsan Cameron  Insulin controlled gestational diabetes; History of Insulin controlled gestational diabetes in 2019               Reported via phone    Assessment & Plan:  Continue Lantus- 58 units at 9 PM dividing into 2 equal doses of 29 units each  Injecting into 2 different areas of the body; one right after the other  2000 calorie gestational diabetes meal plan; 3 meals and 3 snacks  Michael Po SMBG 4 times per day; fasting and 2 hrs pp with a One-Touch Verio blood glucose meter  Labs  Encouraged pt to complete Hba1c A1c & CMP       Ultrasounds  21 US: possible macrosomia, AN normal   Next US scheduled for 2021      Mychal Ireland RN

## 2021-06-02 NOTE — TELEPHONE ENCOUNTER
Return call to Davide Forbes, she will come in for Tdap on Monday   Appt provided, added to schedule by

## 2021-06-02 NOTE — PROGRESS NOTES
Routine Prenatal Visit  [address] [facility], [address], [facility], [address]    Assessment/Plan:  Aldo Singh is a 37y o  year old  at 34w4d who presents for routine prenatal visit  1  High-risk pregnancy in third trimester  Assessment & Plan:  - PTL/PPROM/Bleeding precautions given  Kick counts reviewed  - Reviewed plan for collection of GBS swab at 36 weeks  - TDaP recommended  Inadvertently not reviewed at today's visit  Left voicemail for patient after visit instructing her to call the office to schedule administration this week  - Problem list updated  - PMH, PSH, medications reviewed and updated as needed  - Return to office in 2 wk(s) for routine prenatal care        2  GDM, class A2  Assessment & Plan:  - Compliant with  testing and home BG monitoring    - Current regimen: Lantus 58u qHS  - Growth US scheduled next week  Lab Results   Component Value Date    HGBA1C 6 3 (H) 2019         3  34 weeks gestation of pregnancy  -     POCT urine dip    4  Maternal care due to low transverse uterine scar from previous  delivery  Assessment & Plan:  - Repeat  scheduled for         Subjective:   Donta Webster is a 37 y o   who presents for routine prenatal care at 34w4d  Complaints today: No  LOF: No; VB: No; Contractions: No; FM: Normal    Objective:  /74   Ht 5' 5" (1 651 m)   Wt 127 kg (279 lb)   LMP 10/03/2020 (Exact Date)   BMI 46 43 kg/m²     General: Well appearing, no distress  Respiratory: Unlabored breathing  Cardiovascular: Regular rate  Abdomen: Soft, gravid, nontender  Extremities: Warm and well perfused  Non tender  Pregravid Weight/BMI: Pregravid weight not on file (BMI Could not be calculated)  Current Weight: 127 kg (279 lb)   Total Weight Gain: Not found       Pre-Jerardo Vitals      Most Recent Value   Prenatal Assessment   Fetal Heart Rate  140   Fundal Height (cm)  34 cm   Movement  Present   Prenatal Vitals   Blood Pressure  114/74   Weight - Scale  127 kg (279 lb)   Urine Albumin/Glucose   Dilation/Effacement/Station   Vaginal Drainage   Draining Fluid  No   Edema   LLE Edema  None   RLE Edema  None   Facial Edema  None           Regla Hopper MD  6/2/2021 7:59 PM

## 2021-06-02 NOTE — TELEPHONE ENCOUNTER
Thank you for reporting your blood sugars, Carisa Harrell  You are doing a great job with your regimen  Keep up the good work  Continue 58 units of Lantus at 10 pm, dividing into equal doses of 29 units  Inject into different sites, right after each other       Mk Parker RN

## 2021-06-02 NOTE — ASSESSMENT & PLAN NOTE
- PTL/PPROM/Bleeding precautions given  Kick counts reviewed  - Reviewed plan for collection of GBS swab at 36 weeks  - TDaP recommended  Inadvertently not reviewed at today's visit  Left voicemail for patient after visit instructing her to call the office to schedule administration this week  - Problem list updated    - PMH, PSH, medications reviewed and updated as needed  - Return to office in 2 wk(s) for routine prenatal care

## 2021-06-02 NOTE — ASSESSMENT & PLAN NOTE
- Compliant with  testing and home BG monitoring    - Current regimen: Lantus 58u qHS  - Growth US scheduled next week  Lab Results   Component Value Date    HGBA1C 6 3 (H) 2019

## 2021-06-03 ENCOUNTER — ROUTINE PRENATAL (OUTPATIENT)
Dept: PERINATAL CARE | Facility: OTHER | Age: 36
End: 2021-06-03
Payer: COMMERCIAL

## 2021-06-03 VITALS
DIASTOLIC BLOOD PRESSURE: 78 MMHG | BODY MASS INDEX: 46.65 KG/M2 | HEART RATE: 97 BPM | WEIGHT: 280 LBS | SYSTOLIC BLOOD PRESSURE: 116 MMHG | HEIGHT: 65 IN

## 2021-06-03 DIAGNOSIS — Z3A.34 34 WEEKS GESTATION OF PREGNANCY: ICD-10-CM

## 2021-06-03 DIAGNOSIS — O24.419 GDM, CLASS A2: Primary | ICD-10-CM

## 2021-06-03 PROCEDURE — 59025 FETAL NON-STRESS TEST: CPT | Performed by: OBSTETRICS & GYNECOLOGY

## 2021-06-03 NOTE — PROGRESS NOTES
[address]: Ms Abdon Lakhani was seen today at 34w5d for NST (found under the pregnancy episode) which I reviewed the RN assessment and agree  Please don't hesitate to contact our office with any concerns or questions    Andrew Nick MD

## 2021-06-03 NOTE — PATIENT INSTRUCTIONS
Kick Counts in Pregnancy   AMBULATORY CARE:   Kick counts  measure how much your baby is moving in your womb  A kick from your baby can be felt as a twist, turn, swish, roll, or jab  It is common to feel your baby kicking at 26 to 28 weeks of pregnancy  You may feel your baby kick as early as 20 weeks of pregnancy  You may want to start counting at 28 weeks  Contact your healthcare provider immediately if:   · You feel a change in the number of kicks or movements of your baby  · You feel fewer than 10 kicks within 2 hours  · You have questions or concerns about your baby's movements  Why measure kick counts:  Your baby's movement may provide information about your baby's health  He or she may move less, or not at all, if there are problems  Your baby may move less if he or she is not getting enough oxygen or nutrition from the placenta  Do not smoke while you are pregnant  Smoking decreases the amount of oxygen that gets to your baby  Talk to your healthcare provider if you need help to quit smoking  Tell your healthcare provider as soon as you feel a change in your baby's movements  When to measure kick counts:   · Measure kick counts at the same time every day  · Measure kick counts when your baby is awake and most active  Your baby may be most active in the evening  How to measure kick counts:  Check that your baby is awake before you measure kick counts  You can wake up your baby by lightly pushing on your belly, walking, or drinking something cold  Your healthcare provider may tell you different ways to measure kick counts  You may be told to do the following:  · Use a chart or clock to keep track of the time you start and finish counting  · Sit in a chair or lie on your left side  · Place your hands on the largest part of your belly  · Count until you reach 10 kicks  Write down how much time it takes to count 10 kicks  · It may take 30 minutes to 2 hours to count 10 kicks  It should not take more than 2 hours to count 10 kicks  Follow up with your healthcare provider as directed:  Write down your questions so you remember to ask them during your visits  © Copyright [address] Information is for End User's use only and may not be sold, redistributed or otherwise used for commercial purposes  All illustrations and images included in CareNotes® are the copyrighted property of A D A M , Inc  or [facility] Ramona Collins   The above information is an  only  It is not intended as medical advice for individual conditions or treatments  Talk to your doctor, nurse or pharmacist before following any medical regimen to see if it is safe and effective for you

## 2021-06-07 ENCOUNTER — OFFICE VISIT (OUTPATIENT)
Dept: OBGYN CLINIC | Facility: CLINIC | Age: 36
End: 2021-06-07
Payer: COMMERCIAL

## 2021-06-07 DIAGNOSIS — Z23 NEED FOR PROPHYLACTIC VACCINATION WITH COMBINED DIPHTHERIA-TETANUS-PERTUSSIS (DTP) VACCINE: Primary | ICD-10-CM

## 2021-06-07 PROCEDURE — 90471 IMMUNIZATION ADMIN: CPT | Performed by: OBSTETRICS & GYNECOLOGY

## 2021-06-07 PROCEDURE — 90715 TDAP VACCINE 7 YRS/> IM: CPT | Performed by: OBSTETRICS & GYNECOLOGY

## 2021-06-07 PROCEDURE — 99213 OFFICE O/P EST LOW 20 MIN: CPT | Performed by: OBSTETRICS & GYNECOLOGY

## 2021-06-07 NOTE — PROGRESS NOTES
Pt seen at [facility] office today  Adacel not available there  She came to office here for administration

## 2021-06-08 ENCOUNTER — ROUTINE PRENATAL (OUTPATIENT)
Dept: PERINATAL CARE | Facility: OTHER | Age: 36
End: 2021-06-08
Payer: COMMERCIAL

## 2021-06-08 ENCOUNTER — ULTRASOUND (OUTPATIENT)
Dept: PERINATAL CARE | Facility: OTHER | Age: 36
End: 2021-06-08
Payer: COMMERCIAL

## 2021-06-08 VITALS
WEIGHT: 279.4 LBS | SYSTOLIC BLOOD PRESSURE: 106 MMHG | HEIGHT: 65 IN | BODY MASS INDEX: 46.55 KG/M2 | HEART RATE: 81 BPM | DIASTOLIC BLOOD PRESSURE: 74 MMHG

## 2021-06-08 DIAGNOSIS — Z3A.35 35 WEEKS GESTATION OF PREGNANCY: ICD-10-CM

## 2021-06-08 DIAGNOSIS — O24.419 GDM, CLASS A2: Primary | ICD-10-CM

## 2021-06-08 DIAGNOSIS — Z3A.35 35 WEEKS GESTATION OF PREGNANCY: Primary | ICD-10-CM

## 2021-06-08 DIAGNOSIS — O36.63X0 EXCESSIVE FETAL GROWTH AFFECTING MANAGEMENT OF PREGNANCY IN THIRD TRIMESTER, SINGLE OR UNSPECIFIED FETUS: ICD-10-CM

## 2021-06-08 PROCEDURE — NC001 PR NO CHARGE: Performed by: OBSTETRICS & GYNECOLOGY

## 2021-06-08 PROCEDURE — 99213 OFFICE O/P EST LOW 20 MIN: CPT | Performed by: OBSTETRICS & GYNECOLOGY

## 2021-06-08 PROCEDURE — 76816 OB US FOLLOW-UP PER FETUS: CPT | Performed by: OBSTETRICS & GYNECOLOGY

## 2021-06-08 PROCEDURE — 59025 FETAL NON-STRESS TEST: CPT | Performed by: OBSTETRICS & GYNECOLOGY

## 2021-06-10 ENCOUNTER — ROUTINE PRENATAL (OUTPATIENT)
Dept: PERINATAL CARE | Facility: OTHER | Age: 36
End: 2021-06-10
Payer: COMMERCIAL

## 2021-06-10 VITALS
SYSTOLIC BLOOD PRESSURE: 111 MMHG | HEIGHT: 65 IN | HEART RATE: 80 BPM | DIASTOLIC BLOOD PRESSURE: 75 MMHG | BODY MASS INDEX: 46.62 KG/M2 | WEIGHT: 279.8 LBS

## 2021-06-10 DIAGNOSIS — Z3A.35 35 WEEKS GESTATION OF PREGNANCY: ICD-10-CM

## 2021-06-10 DIAGNOSIS — E66.01 MATERNAL MORBID OBESITY IN THIRD TRIMESTER, ANTEPARTUM (HCC): ICD-10-CM

## 2021-06-10 DIAGNOSIS — O99.213 MATERNAL MORBID OBESITY IN THIRD TRIMESTER, ANTEPARTUM (HCC): ICD-10-CM

## 2021-06-10 DIAGNOSIS — O24.419 GDM, CLASS A2: Primary | ICD-10-CM

## 2021-06-10 PROCEDURE — 59025 FETAL NON-STRESS TEST: CPT | Performed by: OBSTETRICS & GYNECOLOGY

## 2021-06-10 NOTE — PROGRESS NOTES
Pt  Reported active fetal movement at home between visit  Daily fetal kick count reviewed and emphasized  Patient verbalized understanding of all and was receptive      Armin Mcgraw RN

## 2021-06-10 NOTE — PATIENT INSTRUCTIONS
Kick Counts in Pregnancy   AMBULATORY CARE:   Kick counts  measure how much your baby is moving in your womb  A kick from your baby can be felt as a twist, turn, swish, roll, or jab  It is common to feel your baby kicking at 26 to 28 weeks of pregnancy  You may feel your baby kick as early as 20 weeks of pregnancy  You may want to start counting at 28 weeks  Contact your healthcare provider immediately if:   · You feel a change in the number of kicks or movements of your baby  · You feel fewer than 10 kicks within 2 hours  · You have questions or concerns about your baby's movements  Why measure kick counts:  Your baby's movement may provide information about your baby's health  He or she may move less, or not at all, if there are problems  Your baby may move less if he or she is not getting enough oxygen or nutrition from the placenta  Do not smoke while you are pregnant  Smoking decreases the amount of oxygen that gets to your baby  Talk to your healthcare provider if you need help to quit smoking  Tell your healthcare provider as soon as you feel a change in your baby's movements  When to measure kick counts:   · Measure kick counts at the same time every day  · Measure kick counts when your baby is awake and most active  Your baby may be most active in the evening  How to measure kick counts:  Check that your baby is awake before you measure kick counts  You can wake up your baby by lightly pushing on your belly, walking, or drinking something cold  Your healthcare provider may tell you different ways to measure kick counts  You may be told to do the following:  · Use a chart or clock to keep track of the time you start and finish counting  · Sit in a chair or lie on your left side  · Place your hands on the largest part of your belly  · Count until you reach 10 kicks  Write down how much time it takes to count 10 kicks  · It may take 30 minutes to 2 hours to count 10 kicks  It should not take more than 2 hours to count 10 kicks  Follow up with your healthcare provider as directed:  Write down your questions so you remember to ask them during your visits  © Copyright [address] Information is for End User's use only and may not be sold, redistributed or otherwise used for commercial purposes  All illustrations and images included in CareNotes® are the copyrighted property of A D A M , Inc  or [facility]note 3[facility] Ramona Collins   The above information is an  only  It is not intended as medical advice for individual conditions or treatments  Talk to your doctor, nurse or pharmacist before following any medical regimen to see if it is safe and effective for you

## 2021-06-14 ENCOUNTER — ROUTINE PRENATAL (OUTPATIENT)
Dept: PERINATAL CARE | Facility: OTHER | Age: 36
End: 2021-06-14
Payer: COMMERCIAL

## 2021-06-14 ENCOUNTER — DOCUMENTATION (OUTPATIENT)
Dept: PERINATAL CARE | Facility: CLINIC | Age: 36
End: 2021-06-14

## 2021-06-14 VITALS
HEIGHT: 65 IN | DIASTOLIC BLOOD PRESSURE: 76 MMHG | WEIGHT: 278 LBS | HEART RATE: 90 BPM | SYSTOLIC BLOOD PRESSURE: 111 MMHG | BODY MASS INDEX: 46.32 KG/M2

## 2021-06-14 DIAGNOSIS — Z3A.36 36 WEEKS GESTATION OF PREGNANCY: ICD-10-CM

## 2021-06-14 DIAGNOSIS — O24.414 INSULIN CONTROLLED GESTATIONAL DIABETES MELLITUS (GDM) IN THIRD TRIMESTER: Primary | ICD-10-CM

## 2021-06-14 PROCEDURE — 59025 FETAL NON-STRESS TEST: CPT | Performed by: OBSTETRICS & GYNECOLOGY

## 2021-06-15 ENCOUNTER — ROUTINE PRENATAL (OUTPATIENT)
Dept: OBGYN CLINIC | Facility: CLINIC | Age: 36
End: 2021-06-15
Payer: COMMERCIAL

## 2021-06-15 VITALS
BODY MASS INDEX: 46.19 KG/M2 | DIASTOLIC BLOOD PRESSURE: 68 MMHG | WEIGHT: 277.2 LBS | SYSTOLIC BLOOD PRESSURE: 120 MMHG | HEIGHT: 65 IN

## 2021-06-15 DIAGNOSIS — Z36.85 ANTENATAL SCREENING FOR STREPTOCOCCUS B: ICD-10-CM

## 2021-06-15 DIAGNOSIS — Z3A.36 36 WEEKS GESTATION OF PREGNANCY: ICD-10-CM

## 2021-06-15 DIAGNOSIS — O34.211 MATERNAL CARE DUE TO LOW TRANSVERSE UTERINE SCAR FROM PREVIOUS CESAREAN DELIVERY: Primary | ICD-10-CM

## 2021-06-15 DIAGNOSIS — O24.419 GDM, CLASS A2: ICD-10-CM

## 2021-06-15 LAB
SL AMB  POCT GLUCOSE, UA: NEGATIVE
SL AMB POCT URINE PROTEIN: NEGATIVE

## 2021-06-15 PROCEDURE — 99213 OFFICE O/P EST LOW 20 MIN: CPT | Performed by: OBSTETRICS & GYNECOLOGY

## 2021-06-15 NOTE — PROGRESS NOTES
Routine Prenatal Visit  [address] [facility], [address], [facility], [place], [address]    Assessment/Plan:  Bela Combs is a 37y o  year old  at 36w3d who presents for routine prenatal visit  1  Maternal care due to low transverse uterine scar from previous  delivery  Assessment & Plan:  GBS done  Reviewed C/S and surgical consent obtained  2  GDM, class A2  Assessment & Plan:    Lab Results   Component Value Date    HGBA1C 6 3 (H) 2019   Reports good glucose control on 58Units Lantus QHS      3  36 weeks gestation of pregnancy  -     POCT urine dip    4   screening for streptococcus B  -     Strep B DNA probe, amplification        Subjective:     CC: Prenatal care    Jl Lorenz is a 37 y o   female who presents for routine prenatal care at 36w3d  Pregnancy ROS: no leakage of fluid, pelvic pain, or vaginal bleeding  normal fetal movement  The following portions of the patient's history were reviewed and updated as appropriate: allergies, current medications, past family history, past medical history, obstetric history, gynecologic history, past social history, past surgical history and problem list       Objective:  /68   Ht 5' 5" (1 651 m)   Wt 126 kg (277 lb 3 2 oz)   LMP 10/03/2020 (Exact Date)   BMI 46 13 kg/m²   Pregravid Weight/BMI: Pregravid weight not on file (BMI Could not be calculated)  Current Weight: 126 kg (277 lb 3 2 oz)   Total Weight Gain: Not found     Pre-Jerardo Vitals      Most Recent Value   Prenatal Assessment   Fetal Heart Rate  140   Fundal Height (cm)  39 cm   Movement  Present   Presentation  Vertex   Prenatal Vitals   Blood Pressure  120/68   Weight - Scale  126 kg (277 lb 3 2 oz)   Urine Albumin/Glucose   Dilation/Effacement/Station   Vaginal Drainage   Edema   LLE Edema  None   RLE Edema  None   Facial Edema  None           General: Well appearing, no distress  Respiratory: Unlabored breathing  Cardiovascular: Regular rate  Abdomen: Soft, gravid, nontender  Fundal Height: Appropriate for gestational age  Extremities: Warm and well perfused  Non tender

## 2021-06-15 NOTE — PROGRESS NOTES
Date:  21  RE: Sid Maame    : 1985  Estimated Date of Delivery: 7/10/21  EGA: 36w3d  OB/GYN: Ihsan Cameron  Insulin controlled gestational diabetes; History of Insulin controlled gestational diabetes in 2019               Reported via phone    Assessment & Plan:  Continue Lantus- 58 units at 9 PM dividing into 2 equal doses of 29 units each  Injecting into 2 different areas of the body; one right after the other  2000 calorie gestational diabetes meal plan; 3 meals and 3 snacks  Michael Po SMBG 4 times per day; fasting and 2 hrs pp with a One-Touch Verio blood glucose meter      Labs  Encouraged pt to complete A1c   CMP completed 2021 ER visit     Ultrasounds  21 US: possible macrosomia, AN normal   2021  possible macrosomia, AN normal       Testing  NST/AN twice weekly     Mychal Ireland RN

## 2021-06-15 NOTE — ASSESSMENT & PLAN NOTE
Lab Results   Component Value Date    HGBA1C 6 3 (H) 01/25/2019   Reports good glucose control on 58Units Lantus QHS

## 2021-06-17 ENCOUNTER — ULTRASOUND (OUTPATIENT)
Dept: PERINATAL CARE | Facility: OTHER | Age: 36
End: 2021-06-17
Payer: COMMERCIAL

## 2021-06-17 VITALS
HEIGHT: 65 IN | BODY MASS INDEX: 46.15 KG/M2 | HEART RATE: 80 BPM | SYSTOLIC BLOOD PRESSURE: 116 MMHG | WEIGHT: 277 LBS | DIASTOLIC BLOOD PRESSURE: 81 MMHG

## 2021-06-17 DIAGNOSIS — O24.419 GDM, CLASS A2: Primary | ICD-10-CM

## 2021-06-17 LAB — GP B STREP DNA SPEC QL NAA+PROBE: POSITIVE

## 2021-06-17 PROCEDURE — 76815 OB US LIMITED FETUS(S): CPT | Performed by: OBSTETRICS & GYNECOLOGY

## 2021-06-17 PROCEDURE — 59025 FETAL NON-STRESS TEST: CPT | Performed by: OBSTETRICS & GYNECOLOGY

## 2021-06-17 NOTE — PROGRESS NOTES
[address]: Ms Abdirashid Su was seen today at 36w5d for NST (found under the pregnancy episode) which I reviewed the RN assessment and agree, and AN (see ultrasound report under OB procedures tab)  Please don't hesitate to contact our office with any concerns or questions    Claudia Monroy MD

## 2021-06-17 NOTE — PATIENT INSTRUCTIONS
Kick Counts in Pregnancy   AMBULATORY CARE:   Kick counts  measure how much your baby is moving in your womb  A kick from your baby can be felt as a twist, turn, swish, roll, or jab  It is common to feel your baby kicking at 26 to 28 weeks of pregnancy  You may feel your baby kick as early as 20 weeks of pregnancy  You may want to start counting at 28 weeks  Contact your healthcare provider immediately if:   · You feel a change in the number of kicks or movements of your baby  · You feel fewer than 10 kicks within 2 hours  · You have questions or concerns about your baby's movements  Why measure kick counts:  Your baby's movement may provide information about your baby's health  He or she may move less, or not at all, if there are problems  Your baby may move less if he or she is not getting enough oxygen or nutrition from the placenta  Do not smoke while you are pregnant  Smoking decreases the amount of oxygen that gets to your baby  Talk to your healthcare provider if you need help to quit smoking  Tell your healthcare provider as soon as you feel a change in your baby's movements  When to measure kick counts:   · Measure kick counts at the same time every day  · Measure kick counts when your baby is awake and most active  Your baby may be most active in the evening  How to measure kick counts:  Check that your baby is awake before you measure kick counts  You can wake up your baby by lightly pushing on your belly, walking, or drinking something cold  Your healthcare provider may tell you different ways to measure kick counts  You may be told to do the following:  · Use a chart or clock to keep track of the time you start and finish counting  · Sit in a chair or lie on your left side  · Place your hands on the largest part of your belly  · Count until you reach 10 kicks  Write down how much time it takes to count 10 kicks  · It may take 30 minutes to 2 hours to count 10 kicks  It should not take more than 2 hours to count 10 kicks  Follow up with your healthcare provider as directed:  Write down your questions so you remember to ask them during your visits  © Copyright [address] Information is for End User's use only and may not be sold, redistributed or otherwise used for commercial purposes  All illustrations and images included in CareNotes® are the copyrighted property of A D A M , Inc  or [facility] Ramona Collins   The above information is an  only  It is not intended as medical advice for individual conditions or treatments  Talk to your doctor, nurse or pharmacist before following any medical regimen to see if it is safe and effective for you

## 2021-06-22 ENCOUNTER — ULTRASOUND (OUTPATIENT)
Dept: PERINATAL CARE | Facility: OTHER | Age: 36
End: 2021-06-22
Payer: COMMERCIAL

## 2021-06-22 ENCOUNTER — DOCUMENTATION (OUTPATIENT)
Dept: PERINATAL CARE | Facility: CLINIC | Age: 36
End: 2021-06-22

## 2021-06-22 VITALS
HEIGHT: 65 IN | WEIGHT: 278.4 LBS | BODY MASS INDEX: 46.38 KG/M2 | DIASTOLIC BLOOD PRESSURE: 76 MMHG | HEART RATE: 74 BPM | SYSTOLIC BLOOD PRESSURE: 107 MMHG

## 2021-06-22 DIAGNOSIS — O24.419 GDM, CLASS A2: Primary | ICD-10-CM

## 2021-06-22 DIAGNOSIS — Z3A.37 37 WEEKS GESTATION OF PREGNANCY: ICD-10-CM

## 2021-06-22 PROCEDURE — 76815 OB US LIMITED FETUS(S): CPT | Performed by: OBSTETRICS & GYNECOLOGY

## 2021-06-22 PROCEDURE — 59025 FETAL NON-STRESS TEST: CPT | Performed by: OBSTETRICS & GYNECOLOGY

## 2021-06-22 NOTE — PROGRESS NOTES
Date:  21  RE: Mack American    : 1985  Estimated Date of Delivery: 7/10/21  EGA: 37w3d  OB/GYN: Megan Cameron  Insulin controlled gestational diabetes; History of Insulin controlled gestational diabetes in 2019               Reported via phone    Assessment & Plan:  Continue Lantus- 58 units at 9 PM dividing into 2 equal doses of 29 units each  Injecting into 2 different areas of the body; one right after the other  2000 calorie gestational diabetes meal plan; 3 meals and 3 snacks  Anshu Torres SMBG 4 times per day; fasting and 2 hrs pp with a One-Touch Verio blood glucose meter      Labs  Encouraged pt to complete A1c   CMP completed 2021 ER visit     Ultrasounds  21 US: possible macrosomia, AN normal   2021  possible macrosomia, AN normal (Dr Sharon Zee)    2021 AN normal     Testing  NST/AN twice weekly     Maurice Adams RN

## 2021-06-22 NOTE — PROGRESS NOTES
[address]: Ms Gaston Nicole was seen today at 37w3d for NST (found under the pregnancy episode) which I reviewed the RN assessment and agree, and AN (see ultrasound report under OB procedures tab)  Please don't hesitate to contact our office with any concerns or questions    Marion Driscoll MD

## 2021-06-22 NOTE — LETTER
June 22, 2021     [address]  [address]  [place] [facility] [address]    Patient: Merari Martinez   YOB: 1985   Date of Visit: 6/22/2021       Dear Dr Eleanor Pop: Thank you for referring Merari Martinez to me for evaluation  Below are my notes for this consultation  If you have questions, please do not hesitate to call me  I look forward to following your patient along with you  Sincerely,        Griselda Lu, MD        CC: No Recipients  Griselda Lu, MD  6/22/2021 11:20 AM  Incomplete  [address]: Ms Corinna Casiano was seen today at 37w3d for NST (found under the pregnancy episode) which I reviewed the RN assessment and agree, and AN (see ultrasound report under OB procedures tab)  Please don't hesitate to contact our office with any concerns or questions    Griselda Lu, MD

## 2021-06-22 NOTE — PATIENT INSTRUCTIONS
Kick Counts in Pregnancy   AMBULATORY CARE:   Kick counts  measure how much your baby is moving in your womb  A kick from your baby can be felt as a twist, turn, swish, roll, or jab  It is common to feel your baby kicking at 26 to 28 weeks of pregnancy  You may feel your baby kick as early as 20 weeks of pregnancy  You may want to start counting at 28 weeks  Contact your healthcare provider immediately if:   · You feel a change in the number of kicks or movements of your baby  · You feel fewer than 10 kicks within 2 hours  · You have questions or concerns about your baby's movements  Why measure kick counts:  Your baby's movement may provide information about your baby's health  He or she may move less, or not at all, if there are problems  Your baby may move less if he or she is not getting enough oxygen or nutrition from the placenta  Do not smoke while you are pregnant  Smoking decreases the amount of oxygen that gets to your baby  Talk to your healthcare provider if you need help to quit smoking  Tell your healthcare provider as soon as you feel a change in your baby's movements  When to measure kick counts:   · Measure kick counts at the same time every day  · Measure kick counts when your baby is awake and most active  Your baby may be most active in the evening  How to measure kick counts:  Check that your baby is awake before you measure kick counts  You can wake up your baby by lightly pushing on your belly, walking, or drinking something cold  Your healthcare provider may tell you different ways to measure kick counts  You may be told to do the following:  · Use a chart or clock to keep track of the time you start and finish counting  · Sit in a chair or lie on your left side  · Place your hands on the largest part of your belly  · Count until you reach 10 kicks  Write down how much time it takes to count 10 kicks  · It may take 30 minutes to 2 hours to count 10 kicks  It should not take more than 2 hours to count 10 kicks  Follow up with your healthcare provider as directed:  Write down your questions so you remember to ask them during your visits  © Copyright [address] Information is for End User's use only and may not be sold, redistributed or otherwise used for commercial purposes  All illustrations and images included in CareNotes® are the copyrighted property of A D A M , Inc  or [facility] Ramona Collins   The above information is an  only  It is not intended as medical advice for individual conditions or treatments  Talk to your doctor, nurse or pharmacist before following any medical regimen to see if it is safe and effective for you

## 2021-06-23 ENCOUNTER — ROUTINE PRENATAL (OUTPATIENT)
Dept: OBGYN CLINIC | Facility: CLINIC | Age: 36
End: 2021-06-23
Payer: COMMERCIAL

## 2021-06-23 VITALS
WEIGHT: 278 LBS | HEIGHT: 65 IN | BODY MASS INDEX: 46.32 KG/M2 | SYSTOLIC BLOOD PRESSURE: 114 MMHG | DIASTOLIC BLOOD PRESSURE: 74 MMHG

## 2021-06-23 DIAGNOSIS — Z3A.37 37 WEEKS GESTATION OF PREGNANCY: ICD-10-CM

## 2021-06-23 DIAGNOSIS — E66.01 MATERNAL MORBID OBESITY IN THIRD TRIMESTER, ANTEPARTUM (HCC): ICD-10-CM

## 2021-06-23 DIAGNOSIS — O36.63X0 EXCESSIVE FETAL GROWTH AFFECTING MANAGEMENT OF PREGNANCY IN THIRD TRIMESTER, SINGLE OR UNSPECIFIED FETUS: ICD-10-CM

## 2021-06-23 DIAGNOSIS — O34.211 MATERNAL CARE DUE TO LOW TRANSVERSE UTERINE SCAR FROM PREVIOUS CESAREAN DELIVERY: Primary | ICD-10-CM

## 2021-06-23 DIAGNOSIS — O99.213 MATERNAL MORBID OBESITY IN THIRD TRIMESTER, ANTEPARTUM (HCC): ICD-10-CM

## 2021-06-23 LAB
SL AMB  POCT GLUCOSE, UA: NORMAL
SL AMB POCT URINE PROTEIN: NORMAL

## 2021-06-23 PROCEDURE — 99213 OFFICE O/P EST LOW 20 MIN: CPT | Performed by: OBSTETRICS & GYNECOLOGY

## 2021-06-23 NOTE — PROGRESS NOTES
Routine Prenatal Visit  [address], [address], [facility], [address] [facility]    Assessment/Plan:  Yanira Boyd is a 37y o  year old  at 37w4d who presents for routine prenatal visit  1  Maternal care due to low transverse uterine scar from previous  delivery    2  Maternal morbid obesity in third trimester, antepartum ([address] )    3  Excessive fetal growth affecting management of pregnancy in third trimester, single or unspecified fetus    4  37 weeks gestation of pregnancy  -     POCT urine dip          Subjective:     CC: Prenatal care    Elier Jj is a 37 y o   female who presents for routine prenatal care at 37w4d  Pregnancy ROS: no leakage of fluid, pelvic pain, or vaginal bleeding  normal fetal movement  The following portions of the patient's history were reviewed and updated as appropriate: allergies, current medications, past family history, past medical history, obstetric history, gynecologic history, past social history, past surgical history and problem list       Objective:  /74   Ht 5' 5" (1 651 m)   Wt 126 kg (278 lb)   LMP 10/03/2020 (Exact Date)   BMI 46 26 kg/m²   Pregravid Weight/BMI: Pregravid weight not on file (BMI Could not be calculated)  Current Weight: 126 kg (278 lb)   Total Weight Gain: Not found  Pre-Jerardo Vitals      Most Recent Value   Prenatal Assessment   Fetal Heart Rate  135   Fundal Height (cm)  40 cm   Movement  Present   Presentation  Vertex   Prenatal Vitals   Blood Pressure  114/74   Weight - Scale  126 kg (278 lb)   Urine Albumin/Glucose   Dilation/Effacement/Station   Vaginal Drainage   Edema   LLE Edema  Trace   RLE Edema  Trace           General: Well appearing, no distress  Respiratory: Unlabored breathing  Cardiovascular: Regular rate  Abdomen: Soft, gravid, nontender  Fundal Height: Appropriate for gestational age  Extremities: Warm and well perfused  Non tender

## 2021-06-24 ENCOUNTER — ROUTINE PRENATAL (OUTPATIENT)
Dept: PERINATAL CARE | Facility: OTHER | Age: 36
End: 2021-06-24
Payer: COMMERCIAL

## 2021-06-24 VITALS
BODY MASS INDEX: 46.55 KG/M2 | DIASTOLIC BLOOD PRESSURE: 83 MMHG | SYSTOLIC BLOOD PRESSURE: 122 MMHG | HEIGHT: 65 IN | HEART RATE: 86 BPM | WEIGHT: 279.4 LBS

## 2021-06-24 DIAGNOSIS — O24.419 GDM, CLASS A2: Primary | ICD-10-CM

## 2021-06-24 DIAGNOSIS — Z3A.37 37 WEEKS GESTATION OF PREGNANCY: ICD-10-CM

## 2021-06-24 PROCEDURE — 59025 FETAL NON-STRESS TEST: CPT | Performed by: OBSTETRICS & GYNECOLOGY

## 2021-06-24 NOTE — PATIENT INSTRUCTIONS
Kick Counts in Pregnancy   AMBULATORY CARE:   Kick counts  measure how much your baby is moving in your womb  A kick from your baby can be felt as a twist, turn, swish, roll, or jab  It is common to feel your baby kicking at 26 to 28 weeks of pregnancy  You may feel your baby kick as early as 20 weeks of pregnancy  You may want to start counting at 28 weeks  Contact your healthcare provider immediately if:   · You feel a change in the number of kicks or movements of your baby  · You feel fewer than 10 kicks within 2 hours  · You have questions or concerns about your baby's movements  Why measure kick counts:  Your baby's movement may provide information about your baby's health  He or she may move less, or not at all, if there are problems  Your baby may move less if he or she is not getting enough oxygen or nutrition from the placenta  Do not smoke while you are pregnant  Smoking decreases the amount of oxygen that gets to your baby  Talk to your healthcare provider if you need help to quit smoking  Tell your healthcare provider as soon as you feel a change in your baby's movements  When to measure kick counts:   · Measure kick counts at the same time every day  · Measure kick counts when your baby is awake and most active  Your baby may be most active in the evening  How to measure kick counts:  Check that your baby is awake before you measure kick counts  You can wake up your baby by lightly pushing on your belly, walking, or drinking something cold  Your healthcare provider may tell you different ways to measure kick counts  You may be told to do the following:  · Use a chart or clock to keep track of the time you start and finish counting  · Sit in a chair or lie on your left side  · Place your hands on the largest part of your belly  · Count until you reach 10 kicks  Write down how much time it takes to count 10 kicks  · It may take 30 minutes to 2 hours to count 10 kicks  It should not take more than 2 hours to count 10 kicks  Follow up with your healthcare provider as directed:  Write down your questions so you remember to ask them during your visits  © Copyright [address] Information is for End User's use only and may not be sold, redistributed or otherwise used for commercial purposes  All illustrations and images included in CareNotes® are the copyrighted property of A D A M , Inc  or [facility] Ramona Collins   The above information is an  only  It is not intended as medical advice for individual conditions or treatments  Talk to your doctor, nurse or pharmacist before following any medical regimen to see if it is safe and effective for you

## 2021-06-28 DIAGNOSIS — O24.419 GDM, CLASS A2: Primary | ICD-10-CM

## 2021-06-28 NOTE — PATIENT INSTRUCTIONS
Thank you for choosing us for your  care today  If you have any questions about your ultrasound or care, please do not hesitate to contact us or your primary obstetrician  Some general instructions for your pregnancy are:     Protect against coronavirus: Continue to practice social distancing, wear a mask, and wash your hands often  Pregnant women are increased risk of severe COVID  Notify your primary care doctor if you have any symptoms including cough, shortness of breath or difficulty breathing, fever, chills, muscle pain, sore throat, or loss of taste or smell  Pregnant women can receive the coronavirus vaccine   Exercise: Aim for 22 minutes per day (150 minutes per week) of regular exercise  Walking is great!  Nutrition: aim for calcium-rich and iron-rich foods as well as healthy sources of protein   Protect against the flu: get yourself and your entire household vaccinated against influenza  This will protect your baby   Learn about Preeclampsia: preeclampsia is a common, serious high blood pressure complication in pregnancy  A blood pressure of 027SCYY (systolic or top number) or 14KGEK (diastolic or bottom number) is not normal and needs evaluation by your doctor   If you smoke, try to reduce how many cigarettes you smoke or try to quit completely  Do not vape   Other warning signs to watch out for in pregnancy or postpartum: chest pain, obstructed breathing or shortness of breath, seizures, thoughts of hurting yourself or your baby, bleeding, a painful or swollen leg, fever, or headache (see AWHONN POST-BIRTH Warning Signs campaign)  If these happen call 911  Itching is also not normal in pregnancy and if you experience this, especially over your hands and feet, potentially worse at night, notify your doctors     Lastly, if you are contacted regarding participation in a survey about your experience in our office, please know that we take any feedback you provide seriously and use it to improve how we deliver care through our center

## 2021-06-29 ENCOUNTER — DOCUMENTATION (OUTPATIENT)
Dept: PERINATAL CARE | Facility: CLINIC | Age: 36
End: 2021-06-29

## 2021-06-29 ENCOUNTER — ULTRASOUND (OUTPATIENT)
Dept: PERINATAL CARE | Facility: OTHER | Age: 36
End: 2021-06-29
Payer: COMMERCIAL

## 2021-06-29 VITALS
HEIGHT: 65 IN | BODY MASS INDEX: 46.45 KG/M2 | DIASTOLIC BLOOD PRESSURE: 75 MMHG | SYSTOLIC BLOOD PRESSURE: 113 MMHG | HEART RATE: 76 BPM | WEIGHT: 278.8 LBS

## 2021-06-29 DIAGNOSIS — O99.213 MATERNAL MORBID OBESITY IN THIRD TRIMESTER, ANTEPARTUM (HCC): ICD-10-CM

## 2021-06-29 DIAGNOSIS — E66.01 MATERNAL MORBID OBESITY IN THIRD TRIMESTER, ANTEPARTUM (HCC): ICD-10-CM

## 2021-06-29 DIAGNOSIS — O32.2XX0 TRANSVERSE LIE OF FETUS, SINGLE OR UNSPECIFIED FETUS: ICD-10-CM

## 2021-06-29 DIAGNOSIS — O24.419 GDM, CLASS A2: Primary | ICD-10-CM

## 2021-06-29 PROCEDURE — 59025 FETAL NON-STRESS TEST: CPT | Performed by: OBSTETRICS & GYNECOLOGY

## 2021-06-29 PROCEDURE — 76815 OB US LIMITED FETUS(S): CPT | Performed by: OBSTETRICS & GYNECOLOGY

## 2021-06-29 NOTE — PROGRESS NOTES
[address]: Ms Abdirashid Su was seen today at 38w3d for NST (found under the pregnancy episode) which I reviewed the RN assessment and agree, and AN (see ultrasound report under OB procedures tab)  Please don't hesitate to contact our office with any concerns or questions    Claudia Monroy MD

## 2021-06-29 NOTE — LETTER
June 29, 2021     MD Angela [address]  [address] [facility] [address]  [facility] [address]    Patient: Luis Fernando Ryan   YOB: 1985   Date of Visit: 6/29/2021       Dear Dr Bravo Chatfield: Thank you for referring Mack Camarena to me for evaluation  Below are my notes for this consultation  If you have questions, please do not hesitate to call me  I look forward to following your patient along with you  Sincerely,        Adriana Fam MD        CC: No Recipients  Adriana Fam MD  6/29/2021 12:10 PM  Sign when Signing Visit  [email or web address]: Ms Yeni Driver was seen today at 38w3d for NST (found under the pregnancy episode) which I reviewed the RN assessment and agree, and AN (see ultrasound report under OB procedures tab)  Please don't hesitate to contact our office with any concerns or questions    Adriana Fam MD

## 2021-06-29 NOTE — PROGRESS NOTES
Date:  21  RE: Yolanda Garcia    : 1985  Estimated Date of Delivery: 7/10/21  EGA: 38w3d  OB/GYN: [facility]  Insulin controlled gestational diabetes; History of Insulin controlled gestational diabetes in 2019         Assessment & Plan:  Continue Lantus- 58 units at 9 PM dividing into 2 equal doses of 29 units each  Injecting into 2 different areas of the body; one right after the other  2000 calorie gestational diabetes meal plan; 3 meals and 3 snacks  Marco Antonio Jack SMBG 4 times per day; fasting and 2 hrs pp with a One-Touch Verio blood glucose meter      Labs  Encouraged pt to complete A1c   CMP completed 2021 ER visit     Ultrasounds  21 US: possible macrosomia, AN normal   2021  possible macrosomia, AN normal (Dr Collin Tejeda)    2021 AN normal   2021 : awaiting report       Testing  NST/AN twice weekly     Therese Palmer RN

## 2021-06-30 ENCOUNTER — ROUTINE PRENATAL (OUTPATIENT)
Dept: OBGYN CLINIC | Facility: CLINIC | Age: 36
End: 2021-06-30
Payer: COMMERCIAL

## 2021-06-30 VITALS — SYSTOLIC BLOOD PRESSURE: 112 MMHG | BODY MASS INDEX: 46.1 KG/M2 | WEIGHT: 277 LBS | DIASTOLIC BLOOD PRESSURE: 66 MMHG

## 2021-06-30 DIAGNOSIS — Z3A.38 38 WEEKS GESTATION OF PREGNANCY: ICD-10-CM

## 2021-06-30 DIAGNOSIS — O24.419 GDM, CLASS A2: ICD-10-CM

## 2021-06-30 DIAGNOSIS — O09.93 HIGH-RISK PREGNANCY IN THIRD TRIMESTER: ICD-10-CM

## 2021-06-30 DIAGNOSIS — O34.211 MATERNAL CARE DUE TO LOW TRANSVERSE UTERINE SCAR FROM PREVIOUS CESAREAN DELIVERY: Primary | ICD-10-CM

## 2021-06-30 LAB
SL AMB  POCT GLUCOSE, UA: NEGATIVE
SL AMB POCT URINE PROTEIN: NEGATIVE

## 2021-06-30 PROCEDURE — 99213 OFFICE O/P EST LOW 20 MIN: CPT | Performed by: OBSTETRICS & GYNECOLOGY

## 2021-07-01 ENCOUNTER — ROUTINE PRENATAL (OUTPATIENT)
Dept: PERINATAL CARE | Facility: OTHER | Age: 36
End: 2021-07-01
Payer: COMMERCIAL

## 2021-07-01 VITALS
WEIGHT: 279 LBS | SYSTOLIC BLOOD PRESSURE: 124 MMHG | BODY MASS INDEX: 46.48 KG/M2 | DIASTOLIC BLOOD PRESSURE: 78 MMHG | HEIGHT: 65 IN | HEART RATE: 81 BPM

## 2021-07-01 DIAGNOSIS — Z3A.38 38 WEEKS GESTATION OF PREGNANCY: ICD-10-CM

## 2021-07-01 DIAGNOSIS — O24.419 GDM, CLASS A2: Primary | ICD-10-CM

## 2021-07-01 PROCEDURE — 59025 FETAL NON-STRESS TEST: CPT | Performed by: OBSTETRICS & GYNECOLOGY

## 2021-07-01 NOTE — PATIENT INSTRUCTIONS
Kick Counts in Pregnancy   AMBULATORY CARE:   Kick counts  measure how much your baby is moving in your womb  A kick from your baby can be felt as a twist, turn, swish, roll, or jab  It is common to feel your baby kicking at 26 to 28 weeks of pregnancy  You may feel your baby kick as early as 20 weeks of pregnancy  You may want to start counting at 28 weeks  Contact your healthcare provider immediately if:   · You feel a change in the number of kicks or movements of your baby  · You feel fewer than 10 kicks within 2 hours  · You have questions or concerns about your baby's movements  Why measure kick counts:  Your baby's movement may provide information about your baby's health  He or she may move less, or not at all, if there are problems  Your baby may move less if he or she is not getting enough oxygen or nutrition from the placenta  Do not smoke while you are pregnant  Smoking decreases the amount of oxygen that gets to your baby  Talk to your healthcare provider if you need help to quit smoking  Tell your healthcare provider as soon as you feel a change in your baby's movements  When to measure kick counts:   · Measure kick counts at the same time every day  · Measure kick counts when your baby is awake and most active  Your baby may be most active in the evening  How to measure kick counts:  Check that your baby is awake before you measure kick counts  You can wake up your baby by lightly pushing on your belly, walking, or drinking something cold  Your healthcare provider may tell you different ways to measure kick counts  You may be told to do the following:  · Use a chart or clock to keep track of the time you start and finish counting  · Sit in a chair or lie on your left side  · Place your hands on the largest part of your belly  · Count until you reach 10 kicks  Write down how much time it takes to count 10 kicks  · It may take 30 minutes to 2 hours to count 10 kicks  It should not take more than 2 hours to count 10 kicks  Follow up with your healthcare provider as directed:  Write down your questions so you remember to ask them during your visits  © Copyright [address] Information is for End User's use only and may not be sold, redistributed or otherwise used for commercial purposes  All illustrations and images included in CareNotes® are the copyrighted property of A D A M , Inc  or [facility] Ramona Collins   The above information is an  only  It is not intended as medical advice for individual conditions or treatments  Talk to your doctor, nurse or pharmacist before following any medical regimen to see if it is safe and effective for you

## 2021-07-06 ENCOUNTER — ULTRASOUND (OUTPATIENT)
Dept: PERINATAL CARE | Facility: OTHER | Age: 36
End: 2021-07-06
Payer: COMMERCIAL

## 2021-07-06 VITALS
HEIGHT: 65 IN | WEIGHT: 279.4 LBS | HEART RATE: 79 BPM | DIASTOLIC BLOOD PRESSURE: 81 MMHG | BODY MASS INDEX: 46.55 KG/M2 | SYSTOLIC BLOOD PRESSURE: 121 MMHG

## 2021-07-06 DIAGNOSIS — O24.419 GDM, CLASS A2: Primary | ICD-10-CM

## 2021-07-06 DIAGNOSIS — Z3A.39 39 WEEKS GESTATION OF PREGNANCY: ICD-10-CM

## 2021-07-06 PROCEDURE — 76815 OB US LIMITED FETUS(S): CPT | Performed by: OBSTETRICS & GYNECOLOGY

## 2021-07-06 PROCEDURE — 59025 FETAL NON-STRESS TEST: CPT | Performed by: OBSTETRICS & GYNECOLOGY

## 2021-07-06 NOTE — PATIENT INSTRUCTIONS
Thank you for choosing us for your  care today  If you have any questions about your ultrasound or care, please do not hesitate to contact us or your primary obstetrician  Some general instructions for your pregnancy are:     Protect against coronavirus: Continue to practice social distancing, wear a mask, and wash your hands often  Pregnant women are increased risk of severe COVID  Notify your primary care doctor if you have any symptoms including cough, shortness of breath or difficulty breathing, fever, chills, muscle pain, sore throat, or loss of taste or smell  Pregnant women can receive the coronavirus vaccine   Exercise: Aim for 22 minutes per day (150 minutes per week) of regular exercise  Walking is great!  Nutrition: aim for calcium-rich and iron-rich foods as well as healthy sources of protein   Protect against the flu: get yourself and your entire household vaccinated against influenza  This will protect your baby   Learn about Preeclampsia: preeclampsia is a common, serious high blood pressure complication in pregnancy  A blood pressure of 793CKZN (systolic or top number) or 26KDFB (diastolic or bottom number) is not normal and needs evaluation by your doctor   If you smoke, try to reduce how many cigarettes you smoke or try to quit completely  Do not vape   Other warning signs to watch out for in pregnancy or postpartum: chest pain, obstructed breathing or shortness of breath, seizures, thoughts of hurting yourself or your baby, bleeding, a painful or swollen leg, fever, or headache (see AWHONN POST-BIRTH Warning Signs campaign)  If these happen call 911  Itching is also not normal in pregnancy and if you experience this, especially over your hands and feet, potentially worse at night, notify your doctors     Lastly, if you are contacted regarding participation in a survey about your experience in our office, please know that we take any feedback you provide seriously and use it to improve how we deliver care through our center  Kick Counts in Pregnancy   AMBULATORY CARE:   Kick counts  measure how much your baby is moving in your womb  A kick from your baby can be felt as a twist, turn, swish, roll, or jab  It is common to feel your baby kicking at 26 to 28 weeks of pregnancy  You may feel your baby kick as early as 20 weeks of pregnancy  You may want to start counting at 28 weeks  Contact your healthcare provider immediately if:   · You feel a change in the number of kicks or movements of your baby  · You feel fewer than 10 kicks within 2 hours  · You have questions or concerns about your baby's movements  Why measure kick counts:  Your baby's movement may provide information about your baby's health  He or she may move less, or not at all, if there are problems  Your baby may move less if he or she is not getting enough oxygen or nutrition from the placenta  Do not smoke while you are pregnant  Smoking decreases the amount of oxygen that gets to your baby  Talk to your healthcare provider if you need help to quit smoking  Tell your healthcare provider as soon as you feel a change in your baby's movements  When to measure kick counts:   · Measure kick counts at the same time every day  · Measure kick counts when your baby is awake and most active  Your baby may be most active in the evening  How to measure kick counts:  Check that your baby is awake before you measure kick counts  You can wake up your baby by lightly pushing on your belly, walking, or drinking something cold  Your healthcare provider may tell you different ways to measure kick counts  You may be told to do the following:  · Use a chart or clock to keep track of the time you start and finish counting  · Sit in a chair or lie on your left side  · Place your hands on the largest part of your belly  · Count until you reach 10 kicks   Write down how much time it takes to count 10 kicks  · It may take 30 minutes to 2 hours to count 10 kicks  It should not take more than 2 hours to count 10 kicks  Follow up with your healthcare provider as directed:  Write down your questions so you remember to ask them during your visits  © Copyright [address] Information is for End User's use only and may not be sold, redistributed or otherwise used for commercial purposes  All illustrations and images included in CareNotes® are the copyrighted property of [employer] M , Inc  or [facility] Ramona Collins   The above information is an  only  It is not intended as medical advice for individual conditions or treatments  Talk to your doctor, nurse or pharmacist before following any medical regimen to see if it is safe and effective for you

## 2021-07-07 ENCOUNTER — DOCUMENTATION (OUTPATIENT)
Dept: OBGYN CLINIC | Facility: CLINIC | Age: 36
End: 2021-07-07

## 2021-07-07 DIAGNOSIS — Z3A.39 39 WEEKS GESTATION OF PREGNANCY: ICD-10-CM

## 2021-07-07 DIAGNOSIS — O34.211 MATERNAL CARE DUE TO LOW TRANSVERSE UTERINE SCAR FROM PREVIOUS CESAREAN DELIVERY: Primary | ICD-10-CM

## 2021-07-07 DIAGNOSIS — O36.63X0 EXCESSIVE FETAL GROWTH AFFECTING MANAGEMENT OF PREGNANCY IN THIRD TRIMESTER, SINGLE OR UNSPECIFIED FETUS: ICD-10-CM

## 2021-07-07 DIAGNOSIS — O99.213 OBESITY IN PREGNANCY, ANTEPARTUM, THIRD TRIMESTER: ICD-10-CM

## 2021-07-07 DIAGNOSIS — O24.419 GDM, CLASS A2: ICD-10-CM

## 2021-07-07 NOTE — PROGRESS NOTES
[address]: Ms Major Ordaz was seen today at 39w3d for NST (found under the pregnancy episode) which I reviewed the RN assessment and agree, and AN (see ultrasound report under OB procedures tab)  Please don't hesitate to contact our office with any concerns or questions    Leilani Boss MD

## 2021-07-09 NOTE — PROGRESS NOTES
Delivered viable male via LT @ 4903 with 8 and 9 apgars weighing 9#9oz  Dr Adriana Natarajan assist     No qualified surgical resident was available to assist in the case  The assistance of an additional surgeon was critical for completion of the case  Dr Adriana Natarajan provided assistance with exposure, hemostasis, delivery of the infant, tissue manipulation, and suturing  He was present from the start of the procedure until fascial closure  I, the primary surgeon, was present and scrubbed throughout the entirety of the case and performed all key portions of the surgical procedure

## 2021-08-20 ENCOUNTER — POSTPARTUM VISIT (OUTPATIENT)
Dept: OBGYN CLINIC | Facility: CLINIC | Age: 36
End: 2021-08-20
Payer: COMMERCIAL

## 2021-08-20 VITALS — WEIGHT: 254 LBS | DIASTOLIC BLOOD PRESSURE: 70 MMHG | BODY MASS INDEX: 42.27 KG/M2 | SYSTOLIC BLOOD PRESSURE: 110 MMHG

## 2021-08-20 DIAGNOSIS — Z86.32 HISTORY OF GESTATIONAL DIABETES: ICD-10-CM

## 2021-08-20 NOTE — PROGRESS NOTES
[address] [facility], [address], [address] [facility]    Assessment/Plan:  Karon Boo is a 31y o  year old [id number] who presents for postpartum visit  Routine Postpartum Care  1  Normal postpartum exam  2  Contraception: condoms  3  Depression Screen: Negative  4  Feeding: Bottle  5  Cervical cancer screening Up to Date  6  2 hour GTT recommended due to GDM  Order provided today  Patient to schedule  7  Follow up in: 3 months for annual exam or as needed  Additional Problems:  1  Postpartum examination following  delivery    2  History of gestational diabetes  -     Glucose (2 Spec) Tolerance; Future  -     Glucose (2 Spec) Tolerance      Subjective:     CC: Postpartum visit    Estrada Sauer is a 31 y o  y o  female [id number] who presents for a postpartum visit  She is 6 weeks postpartum following a repeat  section on 2021 at 39 weeks  Postpartum course has been uncomplicated  Bleeding no bleeding  Bowel function is normal  Bladder function is normal  Patient is not sexually active  Postpartum Depression: Low Risk     Last EPDS Total Score: 2    Last EPDS Self Harm Result: Never       The following portions of the patient's history were reviewed and updated as appropriate: allergies, current medications, past family history, past medical history, obstetric history, gynecologic history, past social history, past surgical history and problem list     Objective:  /70   Wt 115 kg (254 lb)   LMP 2021 (Approximate)   Breastfeeding No   BMI 42 27 kg/m²   Pregravid Weight/BMI: Pregravid weight not on file (BMI Could not be calculated)  Current Weight: 115 kg (254 lb)   Total Weight Gain: Not found     Pre-Jerardo Vitals      Most Recent Value   Prenatal Assessment   Prenatal Vitals   Blood Pressure  110/70   Weight - Scale  115 kg (254 lb)   Urine Albumin/Glucose   Dilation/Effacement/Station   Vaginal Drainage   Edema Chaperone present? Yes: Jeannine Chaparro RN  General Appearance: alert and oriented, in no acute distress  Abdomen: Soft, non-tender, non-distended, no masses, no rebound or guarding  Pfannenstiel incision well-healed  Pelvic:       External genitalia: Normal appearance, no abnormal pigmentation, no lesions or masses  Normal Bartholin's and [place]'s  Urinary system: Urethral meatus normal, bladder non-tender  Vaginal: normal mucosa without prolapse or lesions  Normal-appearing physiologic discharge  Cervix: Normal-appearing, well-epithelialized, no gross lesions or masses  No cervical motion tenderness  Adnexa: No adnexal masses or tenderness noted  Uterus: Normal-sized, regular contour, midline, mobile, no uterine tenderness  Extremities: Normal range of motion     Skin: normal, no rash or abnormalities  Neurologic: alert, oriented x3  Psychiatric: Appropriate affect, mood stable, cooperative with exam         Asa Graham MD  8/20/2021 10:49 AM

## 2021-09-12 NOTE — PROGRESS NOTES
Routine Prenatal Visit  [address] [facility], [address], [facility], [address] [facility]    Assessment/Plan:  Osei is a 31y o  year old  at 38w3d who presents for routine prenatal visit  1  38 weeks gestation of pregnancy  -     POCT urine dip    2  High-risk pregnancy in third trimester    3  Maternal care due to low transverse uterine scar from previous  delivery    4  GDM, class A2          Subjective:     CC: Prenatal care    Ayo Sol is a 31 y o  [id number] female who presents for routine prenatal care at 38w4d  Pregnancy ROS: no leakage of fluid, pelvic pain, or vaginal bleeding  normal fetal movement  The following portions of the patient's history were reviewed and updated as appropriate: allergies, current medications, past family history, past medical history, obstetric history, gynecologic history, past social history, past surgical history and problem list       Objective:  /66   Wt 126 kg (277 lb)   LMP 10/03/2020 (Exact Date)   BMI 46 10 kg/m²   Pregravid Weight/BMI: Pregravid weight not on file (BMI Could not be calculated)  Current Weight: 126 kg (277 lb)   Total Weight Gain: Not found  Pre-Jerardo Vitals      Most Recent Value   Prenatal Assessment   Prenatal Vitals   Blood Pressure  112/66   Weight - Scale  126 kg (277 lb)   Urine Albumin/Glucose   Dilation/Effacement/Station   Vaginal Drainage   Edema           General: Well appearing, no distress  Respiratory: Unlabored breathing  Cardiovascular: Regular rate  Abdomen: Soft, gravid, nontender  Fundal Height: Appropriate for gestational age  Extremities: Warm and well perfused  Non tender

## 2021-09-21 ENCOUNTER — HOSPITAL ENCOUNTER (EMERGENCY)
Facility: HOSPITAL | Age: 36
Discharge: LEFT BEFORE DISCHARGE | End: 2021-09-21
Payer: COMMERCIAL

## 2021-09-21 VITALS
SYSTOLIC BLOOD PRESSURE: 128 MMHG | DIASTOLIC BLOOD PRESSURE: 89 MMHG | HEIGHT: 65 IN | HEART RATE: 78 BPM | OXYGEN SATURATION: 100 % | WEIGHT: 255 LBS | TEMPERATURE: 97.4 F | RESPIRATION RATE: 20 BRPM | BODY MASS INDEX: 42.49 KG/M2

## 2021-09-21 LAB
ALBUMIN SERPL BCP-MCNC: 3.5 G/DL (ref 3.5–5)
ALP SERPL-CCNC: 98 U/L (ref 46–116)
ALT SERPL W P-5'-P-CCNC: 21 U/L (ref 12–78)
ANION GAP SERPL CALCULATED.3IONS-SCNC: 7 MMOL/L (ref 4–13)
AST SERPL W P-5'-P-CCNC: 10 U/L (ref 5–45)
BACTERIA UR QL AUTO: ABNORMAL /HPF
BASOPHILS # BLD AUTO: 0.04 THOUSANDS/ΜL (ref 0–0.1)
BASOPHILS NFR BLD AUTO: 0 % (ref 0–1)
BILIRUB SERPL-MCNC: 0.2 MG/DL (ref 0.2–1)
BILIRUB UR QL STRIP: NEGATIVE
BUN SERPL-MCNC: 11 MG/DL (ref 5–25)
CALCIUM SERPL-MCNC: 8.5 MG/DL (ref 8.3–10.1)
CHLORIDE SERPL-SCNC: 102 MMOL/L (ref 100–108)
CLARITY UR: CLEAR
CO2 SERPL-SCNC: 30 MMOL/L (ref 21–32)
COLOR UR: YELLOW
CREAT SERPL-MCNC: 0.76 MG/DL (ref 0.6–1.3)
EOSINOPHIL # BLD AUTO: 0.17 THOUSAND/ΜL (ref 0–0.61)
EOSINOPHIL NFR BLD AUTO: 2 % (ref 0–6)
ERYTHROCYTE [DISTWIDTH] IN BLOOD BY AUTOMATED COUNT: 13.2 % (ref 11.6–15.1)
EXT PREG TEST URINE: NEGATIVE
EXT. CONTROL ED NAV: NORMAL
GFR SERPL CREATININE-BSD FRML MDRD: 103 ML/MIN/1.73SQ M
GLUCOSE SERPL-MCNC: 96 MG/DL (ref 65–140)
GLUCOSE UR STRIP-MCNC: NEGATIVE MG/DL
HCT VFR BLD AUTO: 41.6 % (ref 34.8–46.1)
HGB BLD-MCNC: 13.2 G/DL (ref 11.5–15.4)
HGB UR QL STRIP.AUTO: ABNORMAL
IMM GRANULOCYTES # BLD AUTO: 0.02 THOUSAND/UL (ref 0–0.2)
IMM GRANULOCYTES NFR BLD AUTO: 0 % (ref 0–2)
KETONES UR STRIP-MCNC: NEGATIVE MG/DL
LEUKOCYTE ESTERASE UR QL STRIP: NEGATIVE
LYMPHOCYTES # BLD AUTO: 1.59 THOUSANDS/ΜL (ref 0.6–4.47)
LYMPHOCYTES NFR BLD AUTO: 17 % (ref 14–44)
MCH RBC QN AUTO: 27.4 PG (ref 26.8–34.3)
MCHC RBC AUTO-ENTMCNC: 31.7 G/DL (ref 31.4–37.4)
MCV RBC AUTO: 87 FL (ref 82–98)
MONOCYTES # BLD AUTO: 0.43 THOUSAND/ΜL (ref 0.17–1.22)
MONOCYTES NFR BLD AUTO: 5 % (ref 4–12)
NEUTROPHILS # BLD AUTO: 6.88 THOUSANDS/ΜL (ref 1.85–7.62)
NEUTS SEG NFR BLD AUTO: 76 % (ref 43–75)
NITRITE UR QL STRIP: NEGATIVE
NON-SQ EPI CELLS URNS QL MICRO: ABNORMAL /HPF
NRBC BLD AUTO-RTO: 0 /100 WBCS
PH UR STRIP.AUTO: 6 [PH]
PLATELET # BLD AUTO: 271 THOUSANDS/UL (ref 149–390)
PMV BLD AUTO: 9.6 FL (ref 8.9–12.7)
POTASSIUM SERPL-SCNC: 4.3 MMOL/L (ref 3.5–5.3)
PROT SERPL-MCNC: 7.6 G/DL (ref 6.4–8.2)
PROT UR STRIP-MCNC: NEGATIVE MG/DL
RBC # BLD AUTO: 4.81 MILLION/UL (ref 3.81–5.12)
RBC #/AREA URNS AUTO: ABNORMAL /HPF
SODIUM SERPL-SCNC: 139 MMOL/L (ref 136–145)
SP GR UR STRIP.AUTO: 1.01 (ref 1–1.03)
UROBILINOGEN UR QL STRIP.AUTO: 0.2 E.U./DL
WBC # BLD AUTO: 9.13 THOUSAND/UL (ref 4.31–10.16)
WBC #/AREA URNS AUTO: ABNORMAL /HPF

## 2021-09-21 PROCEDURE — 81025 URINE PREGNANCY TEST: CPT | Performed by: EMERGENCY MEDICINE

## 2021-09-21 PROCEDURE — 81001 URINALYSIS AUTO W/SCOPE: CPT | Performed by: EMERGENCY MEDICINE

## 2021-09-21 PROCEDURE — 36415 COLL VENOUS BLD VENIPUNCTURE: CPT | Performed by: EMERGENCY MEDICINE

## 2021-09-21 PROCEDURE — 80053 COMPREHEN METABOLIC PANEL: CPT | Performed by: EMERGENCY MEDICINE

## 2021-09-21 PROCEDURE — 85025 COMPLETE CBC W/AUTO DIFF WBC: CPT | Performed by: EMERGENCY MEDICINE

## 2021-11-03 ENCOUNTER — NURSE TRIAGE (OUTPATIENT)
Dept: OTHER | Facility: OTHER | Age: 36
End: 2021-11-03

## 2021-11-03 DIAGNOSIS — Z20.828 SARS-ASSOCIATED CORONAVIRUS EXPOSURE: Primary | ICD-10-CM

## 2021-11-03 PROCEDURE — U0005 INFEC AGEN DETEC AMPLI PROBE: HCPCS | Performed by: FAMILY MEDICINE

## 2021-11-03 PROCEDURE — U0003 INFECTIOUS AGENT DETECTION BY NUCLEIC ACID (DNA OR RNA); SEVERE ACUTE RESPIRATORY SYNDROME CORONAVIRUS 2 (SARS-COV-2) (CORONAVIRUS DISEASE [COVID-19]), AMPLIFIED PROBE TECHNIQUE, MAKING USE OF HIGH THROUGHPUT TECHNOLOGIES AS DESCRIBED BY CMS-2020-01-R: HCPCS | Performed by: FAMILY MEDICINE

## 2021-11-04 ENCOUNTER — TELEPHONE (OUTPATIENT)
Dept: OTHER | Facility: OTHER | Age: 36
End: 2021-11-04

## 2021-12-23 ENCOUNTER — ANNUAL EXAM (OUTPATIENT)
Dept: OBGYN CLINIC | Facility: CLINIC | Age: 36
End: 2021-12-23
Payer: COMMERCIAL

## 2021-12-23 VITALS
HEIGHT: 63 IN | SYSTOLIC BLOOD PRESSURE: 122 MMHG | DIASTOLIC BLOOD PRESSURE: 64 MMHG | WEIGHT: 267.6 LBS | BODY MASS INDEX: 47.41 KG/M2

## 2021-12-23 DIAGNOSIS — Z01.419 ROUTINE GYNECOLOGICAL EXAMINATION: Primary | ICD-10-CM

## 2021-12-23 DIAGNOSIS — Z12.4 SCREENING FOR MALIGNANT NEOPLASM OF THE CERVIX: ICD-10-CM

## 2021-12-23 PROBLEM — O36.63X0 EXCESSIVE FETAL GROWTH AFFECTING MANAGEMENT OF PREGNANCY IN THIRD TRIMESTER: Status: RESOLVED | Noted: 2021-06-08 | Resolved: 2021-12-23

## 2021-12-23 PROBLEM — E66.01 MATERNAL MORBID OBESITY IN THIRD TRIMESTER, ANTEPARTUM (HCC): Status: RESOLVED | Noted: 2019-01-23 | Resolved: 2021-12-23

## 2021-12-23 PROBLEM — Z3A.35 35 WEEKS GESTATION OF PREGNANCY: Status: RESOLVED | Noted: 2021-06-08 | Resolved: 2021-12-23

## 2021-12-23 PROBLEM — O32.2XX0 TRANSVERSE LIE OF FETUS: Status: RESOLVED | Noted: 2021-06-29 | Resolved: 2021-12-23

## 2021-12-23 PROBLEM — O99.213 MATERNAL MORBID OBESITY IN THIRD TRIMESTER, ANTEPARTUM (HCC): Status: RESOLVED | Noted: 2019-01-23 | Resolved: 2021-12-23

## 2021-12-23 PROBLEM — O24.419 GDM, CLASS A2: Status: RESOLVED | Noted: 2021-04-25 | Resolved: 2021-12-23

## 2021-12-23 PROBLEM — Z3A.37 37 WEEKS GESTATION OF PREGNANCY: Status: RESOLVED | Noted: 2021-06-24 | Resolved: 2021-12-23

## 2021-12-23 PROCEDURE — 99395 PREV VISIT EST AGE 18-39: CPT | Performed by: OBSTETRICS & GYNECOLOGY

## 2021-12-23 NOTE — PROGRESS NOTES
[address], [address], [facility], [address] [facility]    ASSESSMENT/PLAN: Jw Nguyen is a 31 y o  [id number] who presents for annual gynecologic exam     Encounter for routine gynecologic examination  - Routine well woman exam completed today  - Cervical Cancer Screening: Current ASCCP Guidelines reviewed  Last Pap: 06/04/2018   Next Pap Due: today  - HPV Vaccination status: Not immunized  - Breast Cancer Screening: Last Mammogram Not on file,   - Colorectal cancer screening was not ordered  - The following were reviewed in today's visit: breast self exam and family planning choices    Additional problems addressed during this visit:  1  Routine gynecological examination    2  Screening for malignant neoplasm of the cervix  -     IGP, Aptima HPV, Rfx 16/18,45        CC:  Annual Gynecologic Examination    HPI: Jw Nguyen is a 31 y o  [id number] who presents for annual gynecologic examination  HPI    The following portions of the patient's history were reviewed and updated as appropriate: She  has a past medical history of GDM, class A2 (4/25/2021), Gestational diabetes, History of atrial fibrillation, HPV in female, Obesity, unspecified, Ovarian cyst, Papanicolaou smear (05/30/2018), and Urinary frequency  She  has a past surgical history that includes Other surgical history; Mouth surgery; and Mandible surgery (06/2016)  Her family history includes Alcohol abuse in her father; Breast cancer in her maternal grandmother; Cancer in her maternal grandfather and maternal grandmother; Diabetes in her maternal grandfather; Drug abuse in her father; Hypertension in her father and mother; Prostate cancer in her maternal grandfather; Stroke in her maternal grandfather  She  reports that she has never smoked  She has never used smokeless tobacco  She reports current alcohol use  She reports that she does not use drugs    Current Outpatient Medications   Medication Sig Dispense Refill    fluticasone (Flonase) 50 mcg/act nasal spray 1 spray into each nostril daily as needed for rhinitis      Prenatal MV-Min-Fe Fum-FA-DHA (PRENATAL+DHA PO) Take by mouth       No current facility-administered medications for this visit  She is allergic to sulfa antibiotics and latex       Review of Systems      Objective:  /64 (BP Location: Left arm, Patient Position: Sitting, Cuff Size: Large)   Ht 5' 3 25" (1 607 m)   Wt 121 kg (267 lb 9 6 oz)   LMP 12/10/2021 (Exact Date)   Breastfeeding No   BMI 47 03 kg/m²    Physical Exam      PE:  General Appearance: alert and oriented, in no acute distress  HEENT: PERRL, thyroid without masses or tenderness  Breast: No masses, tenderness, skin changes, nipple D/C or axillary or supraclavicular adenopathy  Abdomen: Soft, non-tender, non-distended, no masses, no rebound or guarding  Pelvic:       External genitalia: Normal appearance, no abnormal pigmentation, no lesions or masses  Normal Bartholin's and [place]'s  Urinary system: Urethral meatus normal, bladder non-tender  Vaginal: normal mucosa without prolapse or lesions  Normal-appearing physiologic discharge      Cervix: Normal-appearing, well-epithelialized, no gross lesions or masses No cervical motion tenderness  Adnexa: No adnexal masses or tenderness noted  Uterus: Normal-sized, regular contour, midline, mobile, no uterine tenderness  Extremities: Normal range of motion     Skin: normal, no rash or abnormalities  Neurologic: alert, oriented x3  Psychiatric: Appropriate affect, mood stable, cooperative with exam

## 2022-02-24 NOTE — PROGRESS NOTES
Pt did have blood work in September 2021 and her fasting glucose was 96  While she has not gone for the 2 hour test, this is reassuring

## 2022-10-03 ENCOUNTER — TELEPHONE (OUTPATIENT)
Dept: OBGYN CLINIC | Facility: CLINIC | Age: 37
End: 2022-10-03

## 2022-10-03 NOTE — TELEPHONE ENCOUNTER
Patient l/m requesting a call back as she been having left sided breast pain for few weeks now and now its getting more severe and also hurt under her armpit  She is not sure if she need to be seen or go get a mammogram done since her last one is over a year ago  Attempt to call patient, l/m on pt's vm to call back as she need to schedule an appointment for breast check

## 2022-10-03 NOTE — TELEPHONE ENCOUNTER
Called and spoke with patient  Advised her to schedule an appointment for a breast check due to having left sided breast pain  She verbalized agreement  Transferred her to  to schedule

## 2022-10-04 ENCOUNTER — OFFICE VISIT (OUTPATIENT)
Dept: OBGYN CLINIC | Facility: CLINIC | Age: 37
End: 2022-10-04

## 2022-10-04 VITALS
SYSTOLIC BLOOD PRESSURE: 120 MMHG | WEIGHT: 280 LBS | BODY MASS INDEX: 49.61 KG/M2 | HEIGHT: 63 IN | DIASTOLIC BLOOD PRESSURE: 80 MMHG

## 2022-10-04 DIAGNOSIS — N64.4 BREAST PAIN, LEFT: Primary | ICD-10-CM

## 2022-10-04 DIAGNOSIS — N64.4 MASTODYNIA: ICD-10-CM

## 2022-10-04 NOTE — PATIENT INSTRUCTIONS
Please schedule your   ultrasound and   mammogram    Watch your intake of  caffeine  Please follow up in   2 mo for a breast  check

## 2022-10-04 NOTE — PROGRESS NOTES
PROBLEM GYNECOLOGICAL VISIT    Patrick Reyes is a 30 y o  female who presents today with complaint of pain in left outer breast  And  Axillary area  Her general medical history has been reviewed and she reports it as follows:    Past Medical History:   Diagnosis Date    GDM, class A2 2021    With history of GDM on insulin and a 1 hour glucose of 175 pt desires referral to MFM/DIPP and to skip 3 hour GTT   Gestational diabetes     History of atrial fibrillation     Pt reports was stress related and resolved    HPV in female     Obesity, unspecified     Ovarian cyst     cysts     Papanicolaou smear 2018    Urinary frequency      Past Surgical History:   Procedure Laterality Date    MANDIBLE SURGERY  2016    MOUTH SURGERY      OTHER SURGICAL HISTORY      jaw surgery     OB History        3    Para   2    Term   2            AB   1    Living   2       SAB        IAB   1    Ectopic        Multiple        Live Births   2           Obstetric Comments   Menarche: Age 17   Morbid obesity             Social History     Tobacco Use    Smoking status: Never Smoker    Smokeless tobacco: Never Used   Substance Use Topics    Alcohol use: Yes     Comment: special occasions    Drug use: Never       Current Outpatient Medications   Medication Instructions    fluticasone (Flonase) 50 mcg/act nasal spray 1 spray, Nasal, Daily PRN    Prenatal MV-Min-Fe Fum-FA-DHA (PRENATAL+DHA PO) Oral       History of Present Illness:   No family planning  Pain off and on in  Left  Breast and axillaa hermann  Not reproducable  No fevers or chills  No skin changes  No trauma to area  Off and on pain in left breast  Less than  Right  No breast discharge     Review of Systems:  Review of Systems   Constitutional: Negative  Cardiovascular: Negative for chest pain  Genitourinary:        Pain in  Left axillary and  Lower breast area   For > 1 mo    Not reproducable    No relation to cycle Neurological: Negative  Psychiatric/Behavioral: Negative  Physical Exam:  /80 (BP Location: Left arm, Patient Position: Sitting, Cuff Size: Standard)   Ht 5' 3 25" (1 607 m)   Wt 127 kg (280 lb)   BMI 49 21 kg/m²   Physical Exam  Constitutional:       Appearance: She is obese  Genitourinary:   Breasts: Bartolome Score is 5  Right: No swelling, bleeding, inverted nipple, mass, nipple discharge, skin change, tenderness, breast implant, axillary adenopathy or supraclavicular adenopathy  Left: No swelling, bleeding, inverted nipple, mass, nipple discharge, skin change, tenderness, breast implant, axillary adenopathy or supraclavicular adenopathy  HENT:      Head: Normocephalic  Musculoskeletal:      Cervical back: Normal range of motion and neck supple  No tenderness  Lymphadenopathy:      Upper Body:      Right upper body: No supraclavicular or axillary adenopathy  Left upper body: No supraclavicular or axillary adenopathy  Neurological:      Mental Status: She is alert  *    Assessment:   1  Breast pain, obesity       Plan:   Diagnostic mammogram and   Ultrasound of left breast  Fu  In  2 mo   Reviewed with patient that test results are available in MyChart immediately, but that they will not necessarily be reviewed by me immediately  Explained that I will review results at my earliest opportunity and contact patient appropriately

## 2022-10-26 DIAGNOSIS — N64.4 BREAST PAIN, LEFT: ICD-10-CM

## 2022-10-26 DIAGNOSIS — N64.4 MASTODYNIA: ICD-10-CM

## 2022-12-06 ENCOUNTER — OFFICE VISIT (OUTPATIENT)
Dept: OBGYN CLINIC | Facility: CLINIC | Age: 37
End: 2022-12-06

## 2022-12-06 VITALS
DIASTOLIC BLOOD PRESSURE: 74 MMHG | WEIGHT: 287 LBS | SYSTOLIC BLOOD PRESSURE: 114 MMHG | BODY MASS INDEX: 50.85 KG/M2 | HEIGHT: 63 IN

## 2022-12-06 DIAGNOSIS — N64.4 BREAST PAIN, LEFT: Primary | ICD-10-CM

## 2022-12-06 DIAGNOSIS — N64.4 MASTODYNIA: ICD-10-CM

## 2022-12-06 NOTE — PROGRESS NOTES
PROBLEM GYNECOLOGICAL VISIT    Jw Nguyen is a 30 y o  female who presents today for  Fu from breast  Pain  Her general medical history has been reviewed and she reports it as follows:    Past Medical History:   Diagnosis Date   • GDM, class A2 2021    With history of GDM on insulin and a 1 hour glucose of 175 pt desires referral to MFM/DIPP and to skip 3 hour GTT  • Gestational diabetes    • History of atrial fibrillation     Pt reports was stress related and resolved   • HPV in female    • Obesity, unspecified    • Ovarian cyst     cysts    • Papanicolaou smear 2018   • Urinary frequency      Past Surgical History:   Procedure Laterality Date   • MANDIBLE SURGERY  2016   • MOUTH SURGERY     • OTHER SURGICAL HISTORY      jaw surgery     OB History        3    Para   2    Term   2            AB   1    Living   2       SAB        IAB   1    Ectopic        Multiple        Live Births   2           Obstetric Comments   Menarche: Age 17   Morbid obesity             Social History     Tobacco Use   • Smoking status: Never     Passive exposure: Never   • Smokeless tobacco: Never   Substance Use Topics   • Alcohol use: Yes     Comment: special occasions   • Drug use: Never       Current Outpatient Medications   Medication Instructions   • fluticasone (Flonase) 50 mcg/act nasal spray 1 spray, Nasal, Daily PRN   • Prenatal MV-Min-Fe Fum-FA-DHA (PRENATAL+DHA PO) Oral       History of Present Illness:   Pt with  Pain in breasts  Mammogram   Bilateral negative  birads 1 50-75% d  Pain has decreased   No leaking from breasts, fevers or chills  Pin  Not reproducible  Review of Systems:  Review of Systems   Constitutional: Negative  Respiratory: Negative for shortness of breath  Cardiovascular: Negative  Gastrointestinal: Negative  Genitourinary: Negative  Musculoskeletal: Negative  Skin: Negative  Neurological: Negative  Hematological: Negative  Psychiatric/Behavioral: Negative  Physical Exam:  /74 (BP Location: Right arm, Patient Position: Sitting, Cuff Size: Standard)   Ht 5' 3 25" (1 607 m)   Wt 130 kg (287 lb)   LMP 11/09/2022   BMI 50 44 kg/m²   Physical Exam  Constitutional:       Appearance: Normal appearance  She is normal weight  Genitourinary:      Bladder normal       No lesions in the vagina  Right Labia: No rash, tenderness, lesions or skin changes  Left Labia: No tenderness, lesions, skin changes or rash  No labial fusion noted  No inguinal adenopathy present in the right or left side  No vaginal discharge, erythema, tenderness or bleeding  No vaginal prolapse present  Right Adnexa: not tender, not full, not palpable and no mass present  Left Adnexa: not tender, not full, not palpable and no mass present  Cervix is not nulliparous or parous  No cervical motion tenderness, discharge, friability, lesion, polyp, nabothian cyst, eversion or elongation  No parametrium thickening present  Uterus is not enlarged, fixed, tender or prolapsed  No uterine mass detected  No urethral prolapse, tenderness, mass, hypermobility or discharge present  Pelvic floor neuro is intact  Pelvic exam was performed with patient in the lithotomy position and normal support  Cardiovascular:      Rate and Rhythm: Normal rate and regular rhythm  Pulses: Normal pulses  Heart sounds: Normal heart sounds  Abdominal:      General: Bowel sounds are normal  There is no distension  Palpations: There is no splenomegaly or mass  Tenderness: There is no abdominal tenderness  There is no right CVA tenderness, guarding or rebound  Hernia: There is no hernia in the left inguinal area or right inguinal area  Lymphadenopathy:      Lower Body: No right inguinal adenopathy  No left inguinal adenopathy  Neurological:      General: No focal deficit present  Mental Status: She is alert and oriented to person, place, and time  Skin:     General: Skin is warm and dry  Psychiatric:         Mood and Affect: Mood normal          Behavior: Behavior normal          Thought Content: Thought content normal          Judgment: Judgment normal    Vitals and nursing note reviewed  mammogram reviewed one to one with patient        Assessment:   1  Hx of mastodynia  BMI 50      Plan:   *Negative mammogram  Pain has decreased  Supportive  Bra  May use warm soaks  Motrin or tylenol for pain  Encouraged weight loss   Reviewed with patient that [facility], but that they will not necessarily be reviewed by me immediately  Explained that I will review results at my earliest opportunity and contact patient appropriately

## 2023-01-24 ENCOUNTER — ANNUAL EXAM (OUTPATIENT)
Dept: OBGYN CLINIC | Facility: CLINIC | Age: 38
End: 2023-01-24

## 2023-01-24 VITALS
DIASTOLIC BLOOD PRESSURE: 62 MMHG | WEIGHT: 275.4 LBS | HEIGHT: 64 IN | BODY MASS INDEX: 47.02 KG/M2 | SYSTOLIC BLOOD PRESSURE: 120 MMHG

## 2023-01-24 DIAGNOSIS — E03.9 ACQUIRED HYPOTHYROIDISM: ICD-10-CM

## 2023-01-24 DIAGNOSIS — Z01.419 ROUTINE GYNECOLOGICAL EXAMINATION: Primary | ICD-10-CM

## 2023-01-24 PROBLEM — O34.211 MATERNAL CARE DUE TO LOW TRANSVERSE UTERINE SCAR FROM PREVIOUS CESAREAN DELIVERY: Status: RESOLVED | Noted: 2021-05-20 | Resolved: 2023-01-24

## 2023-01-24 PROBLEM — O09.93 HIGH-RISK PREGNANCY IN THIRD TRIMESTER: Status: RESOLVED | Noted: 2021-06-02 | Resolved: 2023-01-24

## 2023-01-24 NOTE — ASSESSMENT & PLAN NOTE
Has not been checked in some time as she does not have a primary  Will place order and she will find a primary

## 2023-01-24 NOTE — PROGRESS NOTES
[address], [address], [facility], [address] [facility]    ASSESSMENT/PLAN: Bryson Bui is a 30 y o  [id number] who presents for annual gynecologic exam     Encounter for routine gynecologic examination  - Routine well woman exam completed today  - Cervical Cancer Screening: Current ASCCP Guidelines reviewed  Last Pap: 2021   Next Pap Due:   - HPV Vaccination status: Not immunized  - Contraceptive counseling discussed  Current contraception: none  - Breast Cancer Screening: Last Mammogram 10/25/2022,   - Colorectal cancer screening was not ordered  - The following were reviewed in today's visit: breast self exam and family planning choices    Additional problems addressed during this visit:  1  Routine gynecological examination    2  Acquired hypothyroidism  Assessment & Plan:  Has not been checked in some time as she does not have a primary  Will place order and she will find a primary  Orders:  -     TSH + Free T4; Future  -     TSH + Free T4      CC: Annual Gynecologic Examination    HPI: Bryson Bui is a 30 y o  [id number] who presents for annual gynecologic examination  HPI    The following portions of the patient's history were reviewed and updated as appropriate: She  has a past medical history of Fibroid (3/19/19), GDM, class A2 (2021), Gestational diabetes, History of atrial fibrillation, HPV in female, Maternal care due to low transverse uterine scar from previous  delivery (2021), Obesity, unspecified, Ovarian cyst, Papanicolaou smear (2018), and Urinary frequency  She  has a past surgical history that includes Other surgical history; Mouth surgery; Mandible surgery (2016); and  section (3/19/19)    Her family history includes Alcohol abuse in her father; Breast cancer in her maternal grandmother; Cancer in her maternal grandfather and maternal grandmother; Diabetes in her maternal grandfather; Drug abuse in her father; Hypertension in her father and mother; Prostate cancer in her maternal grandfather; Stroke in her maternal grandfather  She  reports that she has never smoked  She has never been exposed to tobacco smoke  She has never used smokeless tobacco  She reports current alcohol use  She reports that she does not use drugs  Current Outpatient Medications   Medication Sig Dispense Refill   • fluticasone (FLONASE) 50 mcg/act nasal spray 1 spray into each nostril daily as needed for rhinitis     • Prenatal MV-Min-Fe Fum-FA-DHA (PRENATAL+DHA PO) Take by mouth       No current facility-administered medications for this visit  She is allergic to sulfa antibiotics and latex       Review of Systems      Objective:  /62 (BP Location: Left arm, Patient Position: Sitting, Cuff Size: Large)   Ht 5' 4 25" (1 632 m)   Wt 125 kg (275 lb 6 4 oz)   LMP 01/08/2023 (Exact Date)   Breastfeeding No   BMI 46 91 kg/m²    Physical Exam      PE:  General Appearance: alert and oriented, in no acute distress  HEENT: PERRL, thyroid without masses or tenderness  Breast: No masses, tenderness, skin changes, nipple D/C or axillary or supraclavicular adenopathy  Abdomen: Soft, non-tender, non-distended, no masses, no rebound or guarding  Pelvic:       External genitalia: Normal appearance, no abnormal pigmentation, no lesions or masses  Normal Bartholin's and [place]'s  Urinary system: Urethral meatus normal, bladder non-tender  Vaginal: normal mucosa without prolapse or lesions  Normal-appearing physiologic discharge      Cervix: Normal-appearing, well-epithelialized, no gross lesions or masses No cervical motion tenderness  Adnexa: No adnexal masses or tenderness noted  Uterus: Normal-sized, regular contour, midline, mobile, no uterine tenderness  Extremities: Normal range of motion     Skin: normal, no rash or abnormalities  Neurologic: alert, oriented x3  Psychiatric: Appropriate affect, mood stable, cooperative with exam

## 2023-03-15 ENCOUNTER — PATIENT MESSAGE (OUTPATIENT)
Dept: OBGYN CLINIC | Facility: CLINIC | Age: 38
End: 2023-03-15

## 2023-03-16 LAB
T4 FREE SERPL DIALY-MCNC: 1.2 NG/DL
TSH SERPL-ACNC: 4.6 UU/ML

## 2023-12-18 ENCOUNTER — TELEPHONE (OUTPATIENT)
Age: 38
End: 2023-12-18

## 2023-12-18 NOTE — TELEPHONE ENCOUNTER
Patient called to request a NP appt for a skin check-she has multiple moles and has had some changes to them recently.  I explained wait list and placed on list

## 2024-01-03 ENCOUNTER — TELEPHONE (OUTPATIENT)
Dept: OBGYN CLINIC | Facility: CLINIC | Age: 39
End: 2024-01-03

## 2024-01-03 NOTE — TELEPHONE ENCOUNTER
Patient last got a mammogram in October 2022.  Is requesting, if appropriate, a mammogram order so she can get it done prior to her WA in February.    If entered, please send message back so I can mail it to patient's address on file.    Thank you!

## 2024-01-05 NOTE — TELEPHONE ENCOUNTER
Left v/m informing patient that mammogram is not needed at this time and to call office back if any further questions.

## 2024-02-07 ENCOUNTER — ANNUAL EXAM (OUTPATIENT)
Dept: OBGYN CLINIC | Facility: CLINIC | Age: 39
End: 2024-02-07
Payer: COMMERCIAL

## 2024-02-07 VITALS
SYSTOLIC BLOOD PRESSURE: 118 MMHG | HEIGHT: 64 IN | WEIGHT: 266 LBS | DIASTOLIC BLOOD PRESSURE: 74 MMHG | BODY MASS INDEX: 45.41 KG/M2

## 2024-02-07 DIAGNOSIS — Z01.419 ROUTINE GYNECOLOGICAL EXAMINATION: Primary | ICD-10-CM

## 2024-02-07 PROCEDURE — 99395 PREV VISIT EST AGE 18-39: CPT | Performed by: OBSTETRICS & GYNECOLOGY

## 2024-02-07 NOTE — PROGRESS NOTES
[facility] - [address], [address], [place] [address]    ASSESSMENT/PLAN: Skylar Huntley is a 38 y.o.  who presents for annual gynecologic exam.    Encounter for routine gynecologic examination  - Routine well woman exam completed today.  - Cervical Cancer Screening: Current ASCCP Guidelines reviewed. Last Pap: 2021 . Next Pap Due: today  - HPV Vaccination status: Not immunized  - Contraceptive counseling discussed.  Current contraception: vasectomy  - Breast Cancer Screening: Last Mammogram 10/25/2022,   - Colorectal cancer screening was not ordered.  - The following were reviewed in today's visit: breast self exam    Additional problems addressed during this visit:  1. Routine gynecological examination        CC:  Annual Gynecologic Examination    HPI: Skylar Huntley is a 38 y.o.  who presents for annual gynecologic examination.  HPI    The following portions of the patient's history were reviewed and updated as appropriate: She  has a past medical history of Fibroid (3/19/19), GDM, class A2 (2021), Gestational diabetes, History of atrial fibrillation, HPV in female, Maternal care due to low transverse uterine scar from previous  delivery (2021), Obesity, unspecified, Ovarian cyst, Papanicolaou smear (2018), and Urinary frequency.  She  has a past surgical history that includes Other surgical history; Mouth surgery; Mandible surgery (2016); and  section (3/19/19).  Her family history includes Alcohol abuse in her father; Breast cancer in her maternal grandmother; Cancer in her maternal grandfather and maternal grandmother; Diabetes in her maternal grandfather; Drug abuse in her father; Hypertension in her father and mother; Prostate cancer in her maternal grandfather; Stroke in her maternal grandfather.  She  reports that she has never smoked. She has never been exposed to tobacco smoke. She has never used smokeless  "tobacco. She reports current alcohol use. She reports that she does not use drugs.  Current Outpatient Medications   Medication Sig Dispense Refill    fluticasone (FLONASE) 50 mcg/act nasal spray 1 spray into each nostril daily as needed for rhinitis       No current facility-administered medications for this visit.     She is allergic to sulfa antibiotics and latex..    Review of Systems      Objective:  /74 (BP Location: Left arm, Patient Position: Sitting, Cuff Size: Large)   Ht 5' 4.25\" (1.632 m)   Wt 121 kg (266 lb)   LMP 01/31/2024   BMI 45.30 kg/m²    Physical Exam      PE:  General Appearance: alert and oriented, in no acute distress.   HEENT: PERRL, thyroid without masses or tenderness  Breast: No masses, tenderness, skin changes, nipple D/C or axillary or supraclavicular adenopathy  Abdomen: Soft, non-tender, non-distended, no masses, no rebound or guarding.  Pelvic:       External genitalia: Normal appearance, no abnormal pigmentation, no lesions or masses. Normal Bartholin's and [place]'s.      Urinary system: Urethral meatus normal, bladder non-tender.      Vaginal: normal mucosa without prolapse or lesions. Normal-appearing physiologic discharge      Cervix: Normal-appearing, well-epithelialized, no gross lesions or masses No cervical motion tenderness.      Adnexa: No adnexal masses or tenderness noted.      Uterus: Normal-sized, regular contour, midline, mobile, no uterine tenderness.  Extremities: Normal range of motion.   Skin: normal, no rash or abnormalities  Neurologic: alert, oriented x3  Psychiatric: Appropriate affect, mood stable, cooperative with exam.  "

## 2024-02-28 ENCOUNTER — OFFICE VISIT (OUTPATIENT)
Dept: CARDIOLOGY CLINIC | Facility: CLINIC | Age: 39
End: 2024-02-28
Payer: COMMERCIAL

## 2024-02-28 VITALS
WEIGHT: 267 LBS | BODY MASS INDEX: 45.47 KG/M2 | OXYGEN SATURATION: 98 % | HEART RATE: 67 BPM | SYSTOLIC BLOOD PRESSURE: 104 MMHG | DIASTOLIC BLOOD PRESSURE: 74 MMHG

## 2024-02-28 DIAGNOSIS — R94.31 ABNORMAL EKG: ICD-10-CM

## 2024-02-28 DIAGNOSIS — R07.2 PRECORDIAL PAIN: Primary | ICD-10-CM

## 2024-02-28 DIAGNOSIS — I48.0 PAROXYSMAL ATRIAL FIBRILLATION (HCC): ICD-10-CM

## 2024-02-28 DIAGNOSIS — R00.2 PALPITATIONS: ICD-10-CM

## 2024-02-28 PROCEDURE — 99204 OFFICE O/P NEW MOD 45 MIN: CPT | Performed by: INTERNAL MEDICINE

## 2024-02-28 PROCEDURE — 93000 ELECTROCARDIOGRAM COMPLETE: CPT | Performed by: INTERNAL MEDICINE

## 2024-02-28 NOTE — PROGRESS NOTES
[facility]    Name:Skylar Huntley   DOS: 2024     Chief Complaint:   Chief Complaint   Patient presents with    New Patient Visit     Hosp.  for Chest pain, Hx of afib at 30 and had cardioversion per pt. Would like to discuss anterior infarct due to it never being discussed with her.    Chest Pain     Off and on, chest pain is mild at the moment and feels it right now with some left arm/ finger tingles. Pt feels that it is stress related.     Palpitations     Off and on, none since hosp. Visit.        HISTORY OF PRESENT ILLNESS:    HPI:  Skylar Huntley is a 38 y.o. female. She  has a past medical history of Fibroid (3/19/19), GDM, class A2 (2021), Gestational diabetes, History of atrial fibrillation, HPV in female, Maternal care due to low transverse uterine scar from previous  delivery (2021), Obesity, unspecified, Ovarian cyst, Papanicolaou smear (2018), and Urinary frequency.    She is presents to establish care with a cardiologist for evaluation of chest pain and palpitations.  Patient reports that she has a history of paroxysmal atrial fibrillation initially diagnosed at the age of 30 and has been cardioverted before.  Records of this are not available to me of that event in the chart, although she does report that she has had no further documented recurrence of atrial fibrillation since that time.  Patient was recently evaluated in the ED at [facility] where she underwent an EKG that demonstrated normal sinus rhythm, reportedly also with evidence of an anterior infarct pattern.    Current cardiac complaints include chest discomfort and palpitations.  Her last reported episode of severe chest discomfort occurred on 2024.  Patient reports that she initially experienced stuttering discomfort in her chest lasting a few minutes at a time.  This was followed by an episode that lasted a few hours continuously which prompted  her presentation to the emergency department.  There, an EKG was performed and demonstrated sinus rhythm reportedly with an anterior infarct pattern which was considered to be old compared to other EKGs available to the physician interpreting the EKG at that time.  The patient had a single high-sensitivity troponin checked, which was within normal limits.    She reports that since that time, she has experienced no severe chest pain episodes but has had some mild discomfort in her the left side of her chest.  This generally occurs at rest, not associated with exertion.  She also reports intermittent palpitations, occurring on and off.  Lasting briefly, seconds to minutes at a time with no longer episodes.  She denies shortness of breath, diaphoresis, dizziness, orthopnea, edema.  She has had no syncopal episodes now or in the past.    There is a family history of heart disease in her father although unspecified, and was felt to be related to lifestyle choices and smoking.  There is no known family history of early MI, arrhythmias, or valvular heart disease.  The patient takes no cardiac medications.         ROS    ROS: Pertinent positives and negatives as described in History of Present Illness. Remainder of a 14 point review of systems was negative.     Allergies   Allergen Reactions    Sulfa Antibiotics Rash and Hives    Latex         Current Outpatient Medications on File Prior to Visit   Medication Sig Dispense Refill    fluticasone (FLONASE) 50 mcg/act nasal spray 1 spray into each nostril daily as needed for rhinitis       No current facility-administered medications on file prior to visit.       Past Medical History:   Diagnosis Date    Fibroid 3/19/19    Were discovered when I had my  with my son    GDM, class A2 2021    With history of GDM on insulin and a 1 hour glucose of 175 pt desires referral to MFM/DIPP and to skip 3 hour GTT.     Gestational diabetes     History of atrial fibrillation      Pt reports was stress related and resolved    HPV in female     Maternal care due to low transverse uterine scar from previous  delivery 2021    Obesity, unspecified     Ovarian cyst     cysts     Papanicolaou smear 2018    Urinary frequency        Past Surgical History:   Procedure Laterality Date     SECTION  3/19/19    MANDIBLE SURGERY  2016    MOUTH SURGERY      OTHER SURGICAL HISTORY      jaw surgery       Family History   Problem Relation Age of Onset    Cancer Maternal Grandmother     Breast cancer Maternal Grandmother     Cancer Maternal Grandfather     Diabetes Maternal Grandfather     Prostate cancer Maternal Grandfather     Stroke Maternal Grandfather     Hypertension Mother     Alcohol abuse Father     Drug abuse Father     Hypertension Father        Social History     Socioeconomic History    Marital status: Single     Spouse name: Not on file    Number of children: Not on file    Years of education: Not on file    Highest education level: Some college, no degree   Occupational History    Occupation: Stay at home mom    Tobacco Use    Smoking status: Never     Passive exposure: Never    Smokeless tobacco: Never   Substance and Sexual Activity    Alcohol use: Yes     Comment: special occasions    Drug use: No    Sexual activity: Yes     Partners: Male     Birth control/protection: None     Comment: No new partner in last year    Other Topics Concern    Not on file   Social History Narrative    Sexual abuse: No    Exercise: Occasionally, weekly    Domestic violence: No    Marital status: Lives with significant other    History of drug/alcohol abuse: Denies    - As per [employer]      Social Determinants of Health     Financial Resource Strain: Not on file   Food Insecurity: Not on file   Transportation Needs: Not on file   Physical Activity: Not on file   Stress: Not on file   Social Connections: Not on file   Intimate Partner Violence: Not on file   Housing Stability:  Not on file       OBJECTIVE:    /74 (BP Location: Left arm, Patient Position: Sitting, Cuff Size: Large)   Pulse 67   Wt 121 kg (267 lb)   LMP 01/31/2024   SpO2 98%   BMI 45.47 kg/m²      BP Readings from Last 3 Encounters:   02/28/24 104/74   02/07/24 118/74   01/24/23 120/62       Wt Readings from Last 3 Encounters:   02/28/24 121 kg (267 lb)   02/07/24 121 kg (266 lb)   01/24/23 125 kg (275 lb 6.4 oz)         Physical Exam  Vitals reviewed.   Constitutional:       General: She is not in acute distress.     Appearance: Normal appearance. She is not diaphoretic.   HENT:      Head: Normocephalic and atraumatic.   Eyes:      Conjunctiva/sclera: Conjunctivae normal.   Neck:      Vascular: No carotid bruit or JVD.   Cardiovascular:      Rate and Rhythm: Normal rate and regular rhythm.      Heart sounds: Normal heart sounds. No murmur heard.     No friction rub. No gallop.   Pulmonary:      Breath sounds: Normal breath sounds. No wheezing, rhonchi or rales.   Abdominal:      General: Abdomen is flat. Bowel sounds are normal.   Musculoskeletal:      Right lower leg: No edema.      Left lower leg: No edema.   Skin:     General: Skin is warm and dry.   Neurological:      General: No focal deficit present.      Mental Status: She is alert and oriented to person, place, and time.   Psychiatric:         Mood and Affect: Mood normal.         Behavior: Behavior normal.                                                       Cardiac testing:   EKG reviewed personally: Sinus rhythm.  Poor anterior R wave progression. Cannot rule out anterior infarct, noted on prior EKGs although the prior tracings are not available for my review.        LABS:  Lab Results   Component Value Date    BUN 15 02/14/2024    CREATININE 0.69 02/14/2024    CALCIUM 9.3 02/14/2024    K 4.1 02/14/2024    CO2 30 02/14/2024     02/14/2024    ALKPHOS 70 02/14/2024    AST 15 02/14/2024    ALT 13 02/14/2024        Lab Results   Component Value Date     WBC 9.13 09/21/2021    HGB 13.2 09/21/2021    HCT 41.6 09/21/2021    MCV 87 09/21/2021     09/21/2021       Lab Results   Component Value Date    HGBA1C 6.3 (H) 01/25/2019       Lab Results   Component Value Date    TSH 5.21 02/14/2024           ASSESSMENT/PLAN:  Diagnoses and all orders for this visit:    Precordial pain  -     POCT ECG  -     Echo complete w/ contrast if indicated; Future    Abnormal EKG  -     Echo complete w/ contrast if indicated; Future    Palpitations  -     AMB extended holter monitor; Future    Paroxysmal atrial fibrillation (HCC)      38-year-old female presenting for cardiac evaluation of atypical chest pain.  We reviewed her EKG in office today, this demonstrates sinus rhythm with poor precordial R wave progression.  I did discuss the pathophysiology of this finding with the patient, and reviewed the possibility that this may represent EKG lead placement particularly given her low pretest risk for coronary artery disease.  Given history of paroxysmal atrial fibrillation that has reportedly required cardioversion in the past, I recommended obtaining a 14-day Zio patch XT to evaluate underlying arrhythmia burden and assess if atrial fibrillation is the cause of her symptoms.  Additionally, given the abnormality noted on her EKG I recommend obtaining a resting transthoracic echocardiogram to assess for underlying structural heart disease and evaluate wall motion.  Her pretest risk for coronary artery disease is low, and therefore through a shared decision-making approach in the absence of recurrent or exertional symptoms we have deferred cardiac stress testing at this time but if she does have recurrence of symptoms I do recommend cardiac stress testing.  I advised the patient of this recommendation.    She will follow-up in office to review the results of testing and discuss the need for further testing.        Vinny Bowers MD      Portions of the record may have been  "created with voice recognition software. Occasional wrong word or \"sound alike\" substitutions may have occurred due to the inherent limitations of voice recognition software. Please review the chart carefully and recognize, using context, where substitutions/typographical errors may have occurred.     "

## 2024-02-28 NOTE — PATIENT INSTRUCTIONS
You were seen today in the Cardiology office for evaluation of chest pain.       Thank you for choosing [facility].    Please call our office or use [email or web address] with any questions.

## 2024-02-29 ENCOUNTER — TELEPHONE (OUTPATIENT)
Dept: CARDIOLOGY CLINIC | Facility: CLINIC | Age: 39
End: 2024-02-29

## 2024-03-06 ENCOUNTER — TELEPHONE (OUTPATIENT)
Dept: DERMATOLOGY | Facility: CLINIC | Age: 39
End: 2024-03-06

## 2024-03-06 NOTE — TELEPHONE ENCOUNTER
Lmom that 05/29/24 appt w/Dr Solis at [place] has been r/s to the [place] Office due to change in the providers schedule, to please call the office to confirm or r/s

## 2024-03-20 ENCOUNTER — OFFICE VISIT (OUTPATIENT)
Dept: DERMATOLOGY | Facility: CLINIC | Age: 39
End: 2024-03-20
Payer: COMMERCIAL

## 2024-03-20 VITALS — BODY MASS INDEX: 44.15 KG/M2 | TEMPERATURE: 98.4 F | WEIGHT: 265 LBS | HEIGHT: 65 IN

## 2024-03-20 DIAGNOSIS — D48.5 NEOPLASM OF UNCERTAIN BEHAVIOR OF SKIN: Primary | ICD-10-CM

## 2024-03-20 PROCEDURE — 99203 OFFICE O/P NEW LOW 30 MIN: CPT | Performed by: STUDENT IN AN ORGANIZED HEALTH CARE EDUCATION/TRAINING PROGRAM

## 2024-03-20 NOTE — PROGRESS NOTES
"[facility] Note     Patient Name: Skylar Huntley  Encounter Date: 3/20/24     Have you been cared for by a [facility] Dermatologist in the last 3 years and, if so, which description applies to you?    NO.   I am considered a \"new\" patient and must complete all patient intake questions. I am FEMALE/of child-bearing potential.    REVIEW OF SYSTEMS:  Have you recently had or currently have any of the following? Recent fever or chills? No  Any non-healing wound? No  Are you pregnant or planning to become pregnant? No  Are you currently or planning to be nursing or breast feeding? No   PAST MEDICAL HISTORY:  Have you personally ever had or currently have any of the following?  If \"YES,\" then please provide more detail. Skin cancer (such as Melanoma, Basal Cell Carcinoma, Squamous Cell Carcinoma?  No  Tuberculosis, HIV/AIDS, Hepatitis B or C: No  Radiation Treatment No   HISTORY OF IMMUNOSUPPRESSION:   Do you have a history of any of the following:  Systemic Immunosuppression such as Diabetes, Biologic or Immunotherapy, Chemotherapy, Organ Transplantation, Bone Marrow Transplantation?  No    Answering \"YES\" requires the addition of the dotphrase \"IMMUNOSUPPRESSED\" as the first diagnosis of the patient's visit.   FAMILY HISTORY:  Any \"first degree relatives\" (parent, brother, sister, or child) with the following?    Skin Cancer, Pancreatic or Other Cancer? No   PATIENT EXPERIENCE:    Do you want the Dermatologist to perform a COMPLETE skin exam today including a clinical examination under the \"bra and underwear\" areas?  no  If necessary, do we have your permission to call and leave a detailed message on your Preferred Phone number that includes your specific medical information?  Yes      Allergies   Allergen Reactions    Sulfa Antibiotics Rash and Hives    Latex       Current Outpatient Medications:     fluticasone (FLONASE) 50 mcg/act nasal spray, 1 spray into each nostril daily as needed for " "rhinitis, Disp: , Rfl:           Whom besides the patient is providing clinical information about today's encounter?   NO ADDITIONAL HISTORIAN (patient alone provided history)    Physical Exam and Assessment/Plan by Diagnosis:    NEOPLASM OF UNCERTAIN BEHAVIOR OF SKIN    Physical Exam:  (Anatomic Location); (Size and Morphological Description); (Differential Diagnosis):  Specimen A; skin; Superior to the angle of the mandible on the left; 0.45 x 0.5 cm; brown to skin color papule that is lightening per patient; DDx; dermal nevus vs less likely atypia      Additional History of Present Condition:  Patient presents for a spot of concern on her left cheek that has been present for years.     Assessment and Plan:  I have discussed with the patient that a sample of skin via a \"skin biopsy” would be potentially helpful to further make a specific diagnosis under the microscope.  Based on a thorough discussion of this condition and the management approach to it (including a comprehensive discussion of the known risks, side effects and potential benefits of treatment), the patient (family) agrees to implement the following specific plan:  Monitor and pictures were taken in the office  Patient has follow up full body skin check in May. Patient will call beforehand if lesion is changing in shape or new symptoms develop. Picture taken on patient's phone to assist in self monitoring and  given for home measurements.  Shave biopsy at the next appointment if changing or worsening    Scribe Attestation      I,:  Deneen Clemens MA am acting as a scribe while in the presence of the attending physician.:       I,:  Rashel Solis MD personally performed the services described in this documentation    as scribed in my presence.:            "

## 2024-03-20 NOTE — PATIENT INSTRUCTIONS
"Assessment and Plan:  I have discussed with the patient that a sample of skin via a \"skin biopsy” would be potentially helpful to further make a specific diagnosis under the microscope.  Based on a thorough discussion of this condition and the management approach to it (including a comprehensive discussion of the known risks, side effects and potential benefits of treatment), the patient (family) agrees to implement the following specific plan:  Monitor and pictures were taken in the office  Patient has follow up in May and will biopsy then      Procedure:  Skin Biopsy.  After a thorough discussion of treatment options and risk/benefits/alternatives (including but not limited to local pain, scarring, dyspigmentation, blistering, possible superinfection, and inability to confirm a diagnosis via histopathology), verbal and written consent were obtained and portion of the rash was biopsied for tissue sample.  See below for consent that was obtained from patient and subsequent Procedure Note.   "

## 2024-03-26 ENCOUNTER — HOSPITAL ENCOUNTER (OUTPATIENT)
Dept: NON INVASIVE DIAGNOSTICS | Facility: HOSPITAL | Age: 39
Discharge: HOME | End: 2024-03-26
Attending: INTERNAL MEDICINE
Payer: COMMERCIAL

## 2024-03-26 VITALS
HEART RATE: 65 BPM | BODY MASS INDEX: 44.15 KG/M2 | DIASTOLIC BLOOD PRESSURE: 74 MMHG | HEIGHT: 65 IN | SYSTOLIC BLOOD PRESSURE: 104 MMHG | WEIGHT: 265 LBS

## 2024-03-26 DIAGNOSIS — R94.31 ABNORMAL EKG: ICD-10-CM

## 2024-03-26 DIAGNOSIS — R07.2 PRECORDIAL PAIN: ICD-10-CM

## 2024-03-26 LAB
AORTIC ROOT: 3.3 CM
APICAL FOUR CHAMBER EJECTION FRACTION: 66 %
BSA FOR ECHO PROCEDURE: 2.23 M2
E WAVE DECELERATION TIME: 187 MS
E/A RATIO: 1.46
FRACTIONAL SHORTENING: 35 (ref 28–44)
INTERVENTRICULAR SEPTUM IN DIASTOLE (PARASTERNAL SHORT AXIS VIEW): 1 CM
INTERVENTRICULAR SEPTUM: 1 CM (ref 0.6–1.1)
LAAS-AP2: 21.5 CM2
LAAS-AP4: 23.6 CM2
LEFT ATRIUM SIZE: 4 CM
LEFT ATRIUM VOLUME (MOD BIPLANE): 70 ML
LEFT ATRIUM VOLUME INDEX (MOD BIPLANE): 31.4 ML/M2
LEFT INTERNAL DIMENSION IN SYSTOLE: 3.3 CM (ref 2.1–4)
LEFT VENTRICULAR INTERNAL DIMENSION IN DIASTOLE: 5.1 CM (ref 3.5–6)
LEFT VENTRICULAR POSTERIOR WALL IN END DIASTOLE: 1.1 CM
LEFT VENTRICULAR STROKE VOLUME: 81 ML
LVSV (TEICH): 81 ML
MV E'TISSUE VEL-LAT: 16 CM/S
MV E'TISSUE VEL-SEP: 11 CM/S
MV PEAK A VEL: 0.74 M/S
MV PEAK E VEL: 108 CM/S
MV STENOSIS PRESSURE HALF TIME: 54 MS
MV VALVE AREA P 1/2 METHOD: 4.07
RA PRESSURE ESTIMATED: 8 MMHG
RIGHT ATRIUM AREA SYSTOLE A4C: 16 CM2
RIGHT VENTRICLE ID DIMENSION: 3.6 CM
RV PSP: 24 MMHG
SL CV LEFT ATRIUM LENGTH A2C: 5.8 CM
SL CV LV EF: 55
SL CV PED ECHO LEFT VENTRICLE DIASTOLIC VOLUME (MOD BIPLANE) 2D: 126 ML
SL CV PED ECHO LEFT VENTRICLE SYSTOLIC VOLUME (MOD BIPLANE) 2D: 45 ML
TR MAX PG: 16 MMHG
TR PEAK VELOCITY: 2 M/S
TRICUSPID ANNULAR PLANE SYSTOLIC EXCURSION: 2.2 CM
TRICUSPID VALVE PEAK REGURGITATION VELOCITY: 2.03 M/S

## 2024-03-26 PROCEDURE — 93306 TTE W/DOPPLER COMPLETE: CPT | Performed by: INTERNAL MEDICINE

## 2024-03-26 PROCEDURE — 93306 TTE W/DOPPLER COMPLETE: CPT

## 2024-03-29 ENCOUNTER — CLINICAL SUPPORT (OUTPATIENT)
Dept: CARDIOLOGY CLINIC | Facility: CLINIC | Age: 39
End: 2024-03-29
Payer: COMMERCIAL

## 2024-03-29 DIAGNOSIS — R00.2 PALPITATIONS: ICD-10-CM

## 2024-03-29 PROCEDURE — 93244 EXT ECG>48HR<7D REV&INTERPJ: CPT | Performed by: INTERNAL MEDICINE

## 2024-05-17 ENCOUNTER — HOSPITAL ENCOUNTER (EMERGENCY)
Facility: HOSPITAL | Age: 39
Discharge: HOME | End: 2024-05-17
Payer: COMMERCIAL

## 2024-05-17 VITALS
OXYGEN SATURATION: 98 % | SYSTOLIC BLOOD PRESSURE: 137 MMHG | HEART RATE: 68 BPM | TEMPERATURE: 98.3 F | DIASTOLIC BLOOD PRESSURE: 58 MMHG | RESPIRATION RATE: 18 BRPM

## 2024-05-17 LAB
ALBUMIN SERPL BCP-MCNC: 4.3 G/DL (ref 3.5–5)
ALP SERPL-CCNC: 71 U/L (ref 34–104)
ALT SERPL W P-5'-P-CCNC: 13 U/L (ref 7–52)
ANION GAP SERPL CALCULATED.3IONS-SCNC: 7 MMOL/L (ref 4–13)
AST SERPL W P-5'-P-CCNC: 13 U/L (ref 13–39)
BASOPHILS # BLD AUTO: 0.03 THOUSANDS/ÂΜL (ref 0–0.1)
BASOPHILS NFR BLD AUTO: 0 % (ref 0–1)
BILIRUB SERPL-MCNC: 0.26 MG/DL (ref 0.2–1)
BILIRUB UR QL STRIP: NEGATIVE
BUN SERPL-MCNC: 15 MG/DL (ref 5–25)
CALCIUM SERPL-MCNC: 9.5 MG/DL (ref 8.4–10.2)
CHLORIDE SERPL-SCNC: 99 MMOL/L (ref 96–108)
CLARITY UR: CLEAR
CO2 SERPL-SCNC: 30 MMOL/L (ref 21–32)
COLOR UR: YELLOW
CREAT SERPL-MCNC: 0.64 MG/DL (ref 0.6–1.3)
EOSINOPHIL # BLD AUTO: 0.09 THOUSAND/ÂΜL (ref 0–0.61)
EOSINOPHIL NFR BLD AUTO: 1 % (ref 0–6)
ERYTHROCYTE [DISTWIDTH] IN BLOOD BY AUTOMATED COUNT: 13.5 % (ref 11.6–15.1)
GFR SERPL CREATININE-BSD FRML MDRD: 115 ML/MIN/1.73SQ M
GLUCOSE SERPL-MCNC: 113 MG/DL (ref 65–140)
GLUCOSE UR STRIP-MCNC: NEGATIVE MG/DL
HCT VFR BLD AUTO: 37.9 % (ref 34.8–46.1)
HGB BLD-MCNC: 11.7 G/DL (ref 11.5–15.4)
HGB UR QL STRIP.AUTO: NEGATIVE
IMM GRANULOCYTES # BLD AUTO: 0.03 THOUSAND/UL (ref 0–0.2)
IMM GRANULOCYTES NFR BLD AUTO: 0 % (ref 0–2)
KETONES UR STRIP-MCNC: NEGATIVE MG/DL
LEUKOCYTE ESTERASE UR QL STRIP: NEGATIVE
LIPASE SERPL-CCNC: 19 U/L (ref 11–82)
LYMPHOCYTES # BLD AUTO: 1.6 THOUSANDS/ÂΜL (ref 0.6–4.47)
LYMPHOCYTES NFR BLD AUTO: 21 % (ref 14–44)
MCH RBC QN AUTO: 24.4 PG (ref 26.8–34.3)
MCHC RBC AUTO-ENTMCNC: 30.9 G/DL (ref 31.4–37.4)
MCV RBC AUTO: 79 FL (ref 82–98)
MONOCYTES # BLD AUTO: 0.35 THOUSAND/ÂΜL (ref 0.17–1.22)
MONOCYTES NFR BLD AUTO: 5 % (ref 4–12)
NEUTROPHILS # BLD AUTO: 5.49 THOUSANDS/ÂΜL (ref 1.85–7.62)
NEUTS SEG NFR BLD AUTO: 73 % (ref 43–75)
NITRITE UR QL STRIP: NEGATIVE
NRBC BLD AUTO-RTO: 0 /100 WBCS
PH UR STRIP.AUTO: 6 [PH]
PLATELET # BLD AUTO: 245 THOUSANDS/UL (ref 149–390)
PMV BLD AUTO: 10.1 FL (ref 8.9–12.7)
POTASSIUM SERPL-SCNC: 4.4 MMOL/L (ref 3.5–5.3)
PROT SERPL-MCNC: 7.5 G/DL (ref 6.4–8.4)
PROT UR STRIP-MCNC: NEGATIVE MG/DL
RBC # BLD AUTO: 4.8 MILLION/UL (ref 3.81–5.12)
SODIUM SERPL-SCNC: 136 MMOL/L (ref 135–147)
SP GR UR STRIP.AUTO: 1.02 (ref 1–1.03)
UROBILINOGEN UR STRIP-ACNC: <2 MG/DL
WBC # BLD AUTO: 7.59 THOUSAND/UL (ref 4.31–10.16)

## 2024-05-17 PROCEDURE — 85025 COMPLETE CBC W/AUTO DIFF WBC: CPT

## 2024-05-17 PROCEDURE — 81003 URINALYSIS AUTO W/O SCOPE: CPT

## 2024-05-17 PROCEDURE — 83690 ASSAY OF LIPASE: CPT

## 2024-05-17 PROCEDURE — 80053 COMPREHEN METABOLIC PANEL: CPT

## 2024-05-17 PROCEDURE — 36415 COLL VENOUS BLD VENIPUNCTURE: CPT

## 2024-05-29 ENCOUNTER — OFFICE VISIT (OUTPATIENT)
Dept: DERMATOLOGY | Facility: CLINIC | Age: 39
End: 2024-05-29
Payer: COMMERCIAL

## 2024-05-29 VITALS — TEMPERATURE: 98.5 F | BODY MASS INDEX: 43.93 KG/M2 | HEIGHT: 65 IN | WEIGHT: 263.7 LBS

## 2024-05-29 DIAGNOSIS — D22.9 MULTIPLE MELANOCYTIC NEVI: Primary | ICD-10-CM

## 2024-05-29 DIAGNOSIS — D22.9 FIBROUS PAPULE OF SKIN: ICD-10-CM

## 2024-05-29 DIAGNOSIS — D48.5 NEOPLASM OF UNCERTAIN BEHAVIOR OF SKIN: ICD-10-CM

## 2024-05-29 DIAGNOSIS — L71.9 ROSACEA: ICD-10-CM

## 2024-05-29 DIAGNOSIS — L81.4 LENTIGINES: ICD-10-CM

## 2024-05-29 DIAGNOSIS — D18.01 CHERRY ANGIOMA: ICD-10-CM

## 2024-05-29 PROCEDURE — 88341 IMHCHEM/IMCYTCHM EA ADD ANTB: CPT | Performed by: STUDENT IN AN ORGANIZED HEALTH CARE EDUCATION/TRAINING PROGRAM

## 2024-05-29 PROCEDURE — 11103 TANGNTL BX SKIN EA SEP/ADDL: CPT | Performed by: STUDENT IN AN ORGANIZED HEALTH CARE EDUCATION/TRAINING PROGRAM

## 2024-05-29 PROCEDURE — 11102 TANGNTL BX SKIN SINGLE LES: CPT | Performed by: STUDENT IN AN ORGANIZED HEALTH CARE EDUCATION/TRAINING PROGRAM

## 2024-05-29 PROCEDURE — 99214 OFFICE O/P EST MOD 30 MIN: CPT | Performed by: STUDENT IN AN ORGANIZED HEALTH CARE EDUCATION/TRAINING PROGRAM

## 2024-05-29 PROCEDURE — 88342 IMHCHEM/IMCYTCHM 1ST ANTB: CPT | Performed by: STUDENT IN AN ORGANIZED HEALTH CARE EDUCATION/TRAINING PROGRAM

## 2024-05-29 PROCEDURE — 88305 TISSUE EXAM BY PATHOLOGIST: CPT | Performed by: STUDENT IN AN ORGANIZED HEALTH CARE EDUCATION/TRAINING PROGRAM

## 2024-05-29 NOTE — PROGRESS NOTES
"[facility] Dermatology Clinic Note     Patient Name: Skylar Huntley  Encounter Date: 05/29/2024     Have you been cared for by a [facility] Dermatologist in the last 3 years and, if so, which description applies to you?    Yes.  I have been here within the last 3 years, and my medical history has NOT changed since that time.  I am FEMALE/of child-bearing potential.    REVIEW OF SYSTEMS:  Have you recently had or currently have any of the following? No changes in my recent health.   PAST MEDICAL HISTORY:  Have you personally ever had or currently have any of the following?  If \"YES,\" then please provide more detail. No changes in my medical history.   HISTORY OF IMMUNOSUPPRESSION: Do you have a history of any of the following:  Systemic Immunosuppression such as Diabetes, Biologic or Immunotherapy, Chemotherapy, Organ Transplantation, Bone Marrow Transplantation?  No     Answering \"YES\" requires the addition of the dotphrase \"IMMUNOSUPPRESSED\" as the first diagnosis of the patient's visit.   FAMILY HISTORY:  Any \"first degree relatives\" (parent, brother, sister, or child) with the following?    No changes in my family's known health.   PATIENT EXPERIENCE:    Do you want the Dermatologist to perform a COMPLETE skin exam today including a clinical examination under the \"bra and underwear\" areas?  Yes  If necessary, do we have your permission to call and leave a detailed message on your Preferred Phone number that includes your specific medical information?  Yes      Allergies   Allergen Reactions    Sulfa Antibiotics Rash and Hives    Latex       Current Outpatient Medications:     fluticasone (FLONASE) 50 mcg/act nasal spray, 1 spray into each nostril daily as needed for rhinitis, Disp: , Rfl:           Whom besides the patient is providing clinical information about today's encounter?   NO ADDITIONAL HISTORIAN (patient alone provided history)    Physical Exam and Assessment/Plan by Diagnosis:    MELANOCYTIC " NEVI  -Relevant exam: Scattered over the trunk/extremities are homogenously pigmented brown macules and papules. ELM performed and without concerning findings.  - Exam and clinical history consistent with melanocytic nevi  - Educated on the ABCDE's of melanoma; handout provided  - Counseled to return to clinic prior to scheduled appointment should any of these lesions change or should any new lesions of concern arise  - Counseled on use of sun protection daily. Reviewed latest FDA sunscreen guidelines, including use of broad spectrum (UVA and UVB blocking) sunscreen or sun protective clothing with SPF 30-50 every 2-3 hours and reapplied after exposure to water; use of photoprotective clothing, including a broad brim hat and UPF rated clothing if outdoors for several hours; avoid use of tanning beds as these pose significant risk for melanoma and skin cancer.    LENTIGINES  OTHER SKIN CHANGES DUE TO CHRONIC EXPOSURE TO NONIONIZING RADIATION  - Relevant exam: Over sun exposed areas are brown macules. ELM performed and without concerning findings.  - Exam and clinical history consistent with lentigines.  - Educated that these are indicative of prior sun exposure.   - Counseled to return to clinic prior to scheduled appointment should any of these lesions change or should any new lesions of concern arise.  - Recommended use of sunscreen as above and below.  - Counseled on use of sun protection daily. Reviewed latest FDA sunscreen guidelines, including use of broad spectrum (UVA and UVB blocking) sunscreen or sun protective clothing with SPF 30-50 every 2-3 hours and reapplied after exposure to water; use of photoprotective clothing, including a broad brim hat and UPF rated clothing if outdoors for several hours; avoid use of tanning beds as these pose significant risk for melanoma and skin cancer.    CHERRY ANGIOMAS  - Relevant exam: Scattered over the trunk/extremities are red papules  - Exam and clinical history  consistent with cherry angiomas  - Educated that these are benign  - Educated that removal is considered aesthetic and would incur a fee.  - Patient does not wish to pursue removal at this time but will contact us should this change.    ROSACEA    Physical Exam:  Anatomic Location Affected:  Cheeks, chin, nose  Morphological Description:  Background centrofacial erythema with visible telangiectasias.  Pertinent Positives:  Pertinent Negatives:        Assessment and Plan:  History and physical most consistent with rosacea  Educated that rosacea is a chronic condition affecting the mid-face (nose, cheeks, chin, forehead, eyelids)  Educated that rosacea often presents as redness, telangiectasias, papules and pustules  Discussed that the underlying cause of rosacea is complex, including genetic, environmental, vascular and inflammatory factors  Discussed that there are various triggers to rosacea and that keeping a symptom journal can be helpful in identifying those triggers that make rosacea worse as some are patient-specific  Educated that UV exposure is a universal trigger and recommended use of daily SPF. Specifically recommended a physical blocker SPF to reduce the risk of flaring secondary to irritation from chemical blocker.  Educated on the use of green tinted makeup to mask red coloration  Discussed treatment ladder including trigger avoidance, SPF use, topical treatment (metronidazole, ivermectin, azelaic acid, brimonidine gel, pimecrolimus), oral treatment options (short courses of oral antibiotics with sub-microbial dosing possible), laser treatment  Based on a thorough discussion of this condition and the management approach to it (including a comprehensive discussion of the known risks, side effects and potential benefits of treatment), the patient (family) agrees to implement the following specific plan:  Start use of SPF daily (look for sunscreens that list ONLY zinc oxide and titanium dioxide under  "\"active ingredients\")  Transition to gentle skin care products (ideally fragrance free). Cerave, cetaphil, vanicream are good brands for this.  Start journal to track any triggers and attempt to avoid all triggers  Patient prefers to hold on other treatments today    NEOPLASM OF UNCERTAIN BEHAVIOR OF SKIN    Physical Exam:  (Anatomic Location); (Size and Morphological Description); (Differential Diagnosis):  Superior to the angle of the mandible on the left; 0.45 x 0.5 cm; brown to skin color papule that is lightening per patient; DDx; dermal nevus vs less likely atypia  Pertinent Positives:    Pertinent Negatives:    Additional History of Present Condition:  Follow up. Patient has not seen any changes.    Assessment and Plan:  I have discussed with the patient that a sample of skin via a \"skin biopsy” would be potentially helpful to further make a specific diagnosis under the microscope.  Based on a thorough discussion of this condition and the management approach to it (including a comprehensive discussion of the known risks, side effects and potential benefits of treatment), the patient (family) agrees to implement the following specific plan:    Monitor and picture were taken in the office. No change from last visit which is reassuring  Self monitoring and recommended to take pictures as a personal journal   Call office if changes    FIBROUS PAPULE (\"ANGIOFIBROMA\")    Physical Exam:  Anatomic Location Affected:  Nasal tip  Morphological Description:  pink to skin colored firm papule without concerning findings on ELM  Pertinent Positives:  Pertinent Negatives:    Additional History of Present Condition:  Noted on exam.    Assessment and Plan:  Based on a thorough discussion of this condition and the management approach to it (including a comprehensive discussion of the known risks, side effects and potential benefits of treatment), the patient (family) agrees to implement the following specific plan:  Reassured " "benign    A fibrous papule is a firm bump that most often occurs on the nose. It is very common and has a characteristic appearance under the microscope.    A fibrous papule develops during late adolescence or early adult life on the nose, or less often, elsewhere on the face. It is a dome shaped shiny bump measuring 2-6 mm in diameter, sometimes with a central hair. Although it looks similar to a skin-colored mole (dermal nevus), it is firmer in texture. It is harmless but will not go away on its own.   It is important to distinguish fibrous papule from basal cell carcinoma, which may also present as a firm shiny bump. Basal cell carcinoma most often arises later in life. It grows slowly and tends to bleed and ulcerate.     A fibrous papule is diagnosed either clinically or by skin biopsy. There are distinctive features  on pathology (proliferation of fibroblasts, fibrotic stroma, and dilated blood vessels).      Fibrous papule is considered a nevus, and develops spontaneously. The precise reason is unknown.  Fibrous papules do not require any treatment. If desired it may be removed by a minor surgical procedure (excision biopsy, shave biopsy or electrosurgery).    NEOPLASM OF UNCERTAIN BEHAVIOR OF SKIN    Physical Exam:  (Anatomic Location); (Size and Morphological Description); (Differential Diagnosis):  A: Left upper arm; 0.6 cm x 0.6 cm brown papule changing per patient; rule out atypia  B: Left lower arm; 0.5 cm x 0.4 cm brown papule changing per patient; rule out atypia  Pertinent Positives:  Pertinent Negatives:    Additional History of Present Condition:  Reported on exam.    Assessment and Plan:  I have discussed with the patient that a sample of skin via a \"skin biopsy” would be potentially helpful to further make a specific diagnosis under the microscope.  Based on a thorough discussion of this condition and the management approach to it (including a comprehensive discussion of the known risks, side " effects and potential benefits of treatment), the patient (family) agrees to implement the following specific plan:    Procedure:  Skin Biopsy.  After a thorough discussion of treatment options and risk/benefits/alternatives (including but not limited to local pain, scarring, dyspigmentation, blistering, possible superinfection, and inability to confirm a diagnosis via histopathology), verbal and written consent were obtained and portion of the rash was biopsied for tissue sample.  See below for consent that was obtained from patient and subsequent Procedure Note.    PROCEDURE TANGENTIAL (SHAVE) BIOPSY NOTE:    Performing Physician:   Anatomic Location; Clinical Description with size (cm); Pre-Op Diagnosis:   A: Left upper arm; 0.6 cm x 0.6 cm brown papule changing per patient; rule out atypia  B: Left lower arm; 0.5 cm x 0.4 cm brown papule changing per patient; rule out atypia  Post-op diagnosis: Same     Local anesthesia: 2% Xylocaine with epi     Topical anesthesia: None    Hemostasis: Aluminum chloride       After obtaining informed consent  at which time there was a discussion about the purpose of biopsy  and low risks of infection and bleeding.  The area was prepped and draped in the usual fashion. Anesthesia was obtained with 1% lidocaine with epinephrine. A shave biopsy to an appropriate sampling depth was obtained by Shave (Dermablade or 15 blade) The resulting wound was covered with surgical ointment and bandaged appropriately.     The patient tolerated the procedure well without complications and was without signs of functional compromise.      Specimen has been sent for review by Dermatopathology.    Standard post-procedure care has been explained and has been included in written form within the patient's copy of Informed Consent.    INFORMED CONSENT DISCUSSION AND POST-OPERATIVE INSTRUCTIONS FOR PATIENT    I.  RATIONALE FOR PROCEDURE  I understand that a skin biopsy allows the Dermatologist to  "test a lesion or rash under the microscope to obtain a diagnosis.  It usually involves numbing the area with numbing medication and removing a small piece of skin; sometimes the area will be closed with sutures. In this specific procedure, sutures are not usually needed.  If any sutures are placed, then they are usually need to be removed in 2 weeks or less.    I understand that my Dermatologist recommends that a skin \"shave\" biopsy be performed today.  A local anesthetic, similar to the kind that a dentist uses when filling a cavity, will be injected with a very small needle into the skin area to be sampled.  The injected skin and tissue underneath \"will go to sleep” and become numb so no pain should be felt afterwards.  An instrument shaped like a tiny \"razor blade\" (shave biopsy instrument) will be used to cut a small piece of tissue and skin from the area so that a sample of tissue can be taken and examined more closely under the microscope.  A slight amount of bleeding will occur, but it will be stopped with direct pressure and a pressure bandage and any other appropriate methods.  I understands that a scar will form where the wound was created.  Surgical ointment will be applied to help protect the wound.  Sutures are not usually needed.    II.  RISKS AND POTENTIAL COMPLICATIONS   I understand the risks and potential complications of a skin biopsy include but are not limited to the following:  Bleeding  Infection  Pain  Scar/keloid  Skin discoloration  Incomplete Removal  Recurrence  Nerve Damage/Numbness/Loss of Function  Allergic Reaction to Anesthesia  Biopsies are diagnostic procedures and based on findings additional treatment or evaluation may be required  Loss or destruction of specimen resulting in no additional findings    My Dermatologist has explained to me the nature of the condition, the nature of the procedure, and the benefits to be reasonably expected compared with alternative approaches.  My " "Dermatologist has discussed the likelihood of major risks or complications of this procedure including the specific risks listed above, such as bleeding, infection, and scarring/keloid.  I understand that a scar is expected after this procedure.  I understand that my physician cannot predict if the scar will form a \"keloid,\" which extends beyond the borders of the wound that is created.  A keloid is a thick, painful, and bumpy scar.  A keloid can be difficult to treat, as it does not always respond well to therapy, which includes injecting cortisone directly into the keloid every few weeks.  While this usually reduces the pain and size of the scar, it does not eliminate it.      I understand that photographs may be taken before and after the procedure.  These will be maintained as part of the medical providers confidential records and may not be made available to me.  I further authorize the medical provider to use the photographs for teaching purposes or to illustrate scientific papers, books, or lectures if in his/her judgment, medical research, education, or science may benefit from its use.    I have had an opportunity to fully inquire about the risks and benefits of this procedure and its alternatives.   I have been given ample time and opportunity to ask questions and to seek a second opinion if I wished to do so.  I acknowledge that there have specifically been no guarantees as to the cosmetic results from the procedure.  I am aware that with any procedure there is always the possibility of an unexpected complication.    III. POST-PROCEDURAL CARE (WHAT YOU WILL NEED TO DO \"AFTER THE BIOPSY\" TO OPTIMIZE HEALING)    Keep the area clean and dry.  Try NOT to remove the bandage or get it wet for the first 24 hours.    Gently clean the area and apply surgical ointment (such as Vaseline petrolatum ointment, which is available \"over the counter\" and not a prescription) to the biopsy site for up to 2 weeks straight.  " This acts to protect the wound from the outside world.      Sutures are not usually placed in this procedure.  If any sutures were placed, return for suture removal as instructed (generally 1 week for the face, 2 weeks for the body).      Take Acetaminophen (Tylenol) for discomfort, if no contraindications.  Ibuprofen or aspirin could make bleeding worse.    Call our office immediately for signs of infection: fever, chills, increased redness, warmth, tenderness, discomfort/pain, or pus or foul smell coming from the wound.    WHAT TO DO IF THERE IS ANY BLEEDING?  If a small amount of bleeding is noticed, place a clean cloth over the area and apply firm pressure for ten minutes.  Check the wound after 10 minutes of direct pressure.  If bleeding persists, try one more time for an additional 10 minutes of direct pressure on the area.  If the bleeding becomes heavier or does not stop after the second attempt, or if you have any other questions about this procedure, then please call your [facility] Dermatologist by calling [phone] (SKIN).     I hereby acknowledge that I have reviewed and verified the site with my Dermatologist and have requested and authorized my Dermatologist to proceed with the procedure.        Scribe Attestation      I,:  Jeannie Zavala MA am acting as a scribe while in the presence of the attending physician.:       I,:  Rashel Solis MD personally performed the services described in this documentation    as scribed in my presence.:

## 2024-06-03 PROCEDURE — 88341 IMHCHEM/IMCYTCHM EA ADD ANTB: CPT | Performed by: STUDENT IN AN ORGANIZED HEALTH CARE EDUCATION/TRAINING PROGRAM

## 2024-06-03 PROCEDURE — 88305 TISSUE EXAM BY PATHOLOGIST: CPT | Performed by: STUDENT IN AN ORGANIZED HEALTH CARE EDUCATION/TRAINING PROGRAM

## 2024-06-03 PROCEDURE — 88342 IMHCHEM/IMCYTCHM 1ST ANTB: CPT | Performed by: STUDENT IN AN ORGANIZED HEALTH CARE EDUCATION/TRAINING PROGRAM

## 2024-06-04 NOTE — RESULT ENCOUNTER NOTE
DERMATOPATHOLOGY RESULT NOTE    Results reviewed by ordering physician.  Called patient to personally discuss results. T[employer] message sent      Instructions for Clinical Derm Team:   (remember to route Result Note to appropriate staff):    None    Result & Plan by Specimen:    Specimen A: benign  Plan: monitor      Specimen B: benign  Plan: monitor

## 2024-06-06 NOTE — RESULT ENCOUNTER NOTE
Pathology results erroneously excluded from last result note:       Component   Case Report  Surgical Pathology Report                         Case: [id number]                                  Authorizing Provider:  Rashel Solis MD Collected:           05/29/2024 [facility]              Ordering Location:     [facility]      Received:            05/29/2024 [facility]                                     [place]                                                                Pathologist:           Kevin Swartz MD                                                          Specimens:   A) - Skin, Other, A: Left upper arm                                                                 B) - Skin, Other, B: Left lower arm                                                      Addendum  SOX10 and PRAME immunostains were reviewed and support the diagnoses.       Addendum electronically signed by Kevin Swartz MD on 6/4/2024 at  6:22 PM  Final Diagnosis  A. Skin, left upper arm, shave biopsy:    Compound melanocytic nevus; extends to the tissue edges.         B. Skin, left lower arm, shave biopsy:    Compound melanocytic nevus; extends to the tissue edges.

## 2025-02-12 ENCOUNTER — ANNUAL EXAM (OUTPATIENT)
Dept: OBGYN CLINIC | Facility: CLINIC | Age: 40
End: 2025-02-12
Payer: COMMERCIAL

## 2025-02-12 VITALS
SYSTOLIC BLOOD PRESSURE: 108 MMHG | DIASTOLIC BLOOD PRESSURE: 68 MMHG | WEIGHT: 271 LBS | BODY MASS INDEX: 46.26 KG/M2 | HEIGHT: 64 IN

## 2025-02-12 DIAGNOSIS — Z12.4 SCREENING FOR MALIGNANT NEOPLASM OF THE CERVIX: ICD-10-CM

## 2025-02-12 DIAGNOSIS — Z01.419 ROUTINE GYNECOLOGICAL EXAMINATION: Primary | ICD-10-CM

## 2025-02-12 PROCEDURE — 99395 PREV VISIT EST AGE 18-39: CPT | Performed by: OBSTETRICS & GYNECOLOGY

## 2025-02-12 NOTE — PROGRESS NOTES
[facility] - [address], [address], [place] [address]    ASSESSMENT/PLAN: Skylar Huntley is a 39 y.o.  who presents for annual gynecologic exam.    Encounter for routine gynecologic examination  - Routine well woman exam completed today.  - Cervical Cancer Screening: Current ASCCP Guidelines reviewed. Last Pap: 2021 . Next Pap Due: today  - HPV Vaccination status: Not immunized  - Contraceptive counseling discussed.  Current contraception: vasectomy  - Breast Cancer Screening: Last Mammogram 10/25/2022,   - Colorectal cancer screening was not ordered.  - The following were reviewed in today's visit: breast self exam    Additional problems addressed during this visit:  1. Routine gynecological examination  2. Screening for malignant neoplasm of the cervix  -     IGP, Aptima HPV, Rfx 16/18,45        CC:  Annual Gynecologic Examination    HPI: Skylar Huntley is a 39 y.o.  who presents for annual gynecologic examination.  HPI    The following portions of the patient's history were reviewed and updated as appropriate: She  has a past medical history of Fibroid (3/19/19), GDM, class A2 (2021), Gestational diabetes, History of atrial fibrillation, HPV in female, Maternal care due to low transverse uterine scar from previous  delivery (2021), Obesity, unspecified, Ovarian cyst, Papanicolaou smear (2018), and Urinary frequency.  She  has a past surgical history that includes Other surgical history; Mouth surgery; Mandible surgery (2016); and  section (3/19/19).  Her family history includes Alcohol abuse in her father; Breast cancer in her maternal grandmother; Cancer in her maternal grandfather, maternal grandmother, and maternal uncle; Diabetes in her maternal grandfather; Drug abuse in her father and half-brother; Hypertension in her father and mother; No Known Problems in her child, half-brother, half-brother, paternal  "grandfather, paternal grandmother, and son; Prostate cancer in her maternal grandfather; Stroke in her maternal grandfather.  She  reports that she has never smoked. She has never been exposed to tobacco smoke. She has never used smokeless tobacco. She reports current alcohol use. She reports that she does not use drugs.  Current Outpatient Medications   Medication Sig Dispense Refill    fluticasone (FLONASE) 50 mcg/act nasal spray 1 spray into each nostril daily as needed for rhinitis       No current facility-administered medications for this visit.     She is allergic to sulfa antibiotics and latex..    Review of Systems      Objective:  /68 (BP Location: Left arm, Patient Position: Sitting, Cuff Size: Thigh)   Ht 5' 4\" (1.626 m)   Wt 123 kg (271 lb)   LMP 02/06/2025 (Exact Date)   BMI 46.52 kg/m²    Physical Exam      PE:  General Appearance: alert and oriented, in no acute distress.   HEENT: PERRL, thyroid without masses or tenderness  Breast: No masses, tenderness, skin changes, nipple D/C or axillary or supraclavicular adenopathy  Abdomen: Soft, non-tender, non-distended, no masses, no rebound or guarding.  Pelvic:       External genitalia: Normal appearance, no abnormal pigmentation, no lesions or masses. Normal Bartholin's and [place]'s.      Urinary system: Urethral meatus normal, bladder non-tender.      Vaginal: normal mucosa without prolapse or lesions. Normal-appearing physiologic discharge      Cervix: Normal-appearing, well-epithelialized, no gross lesions or masses No cervical motion tenderness.      Adnexa: No adnexal masses or tenderness noted.      Uterus: Normal-sized, regular contour, midline, mobile, no uterine tenderness.  Extremities: Normal range of motion.   Skin: normal, no rash or abnormalities  Neurologic: alert, oriented x3  Psychiatric: Appropriate affect, mood stable, cooperative with exam.  "

## 2025-02-17 LAB
CYTOLOGIST CVX/VAG CYTO: NORMAL
DX ICD CODE: NORMAL
HPV GENOTYPE REFLEX: NORMAL
HPV I/H RISK 4 DNA CVX QL PROBE+SIG AMP: NEGATIVE
OTHER STN SPEC: NORMAL
PATH REPORT.FINAL DX SPEC: NORMAL
SL AMB NOTE:: NORMAL
SL AMB SPECIMEN ADEQUACY: NORMAL
SL AMB TEST METHODOLOGY: NORMAL